# Patient Record
Sex: MALE | Race: WHITE | Employment: OTHER | URBAN - METROPOLITAN AREA
[De-identification: names, ages, dates, MRNs, and addresses within clinical notes are randomized per-mention and may not be internally consistent; named-entity substitution may affect disease eponyms.]

---

## 2021-02-13 ENCOUNTER — APPOINTMENT (OUTPATIENT)
Dept: ULTRASOUND IMAGING | Age: 71
DRG: 603 | End: 2021-02-13
Payer: MEDICARE

## 2021-02-13 ENCOUNTER — HOSPITAL ENCOUNTER (INPATIENT)
Age: 71
LOS: 1 days | Discharge: HOME | DRG: 603 | End: 2021-02-14
Attending: EMERGENCY MEDICINE | Admitting: HOSPITALIST
Payer: MEDICARE

## 2021-02-13 ENCOUNTER — APPOINTMENT (OUTPATIENT)
Dept: CT IMAGING | Age: 71
DRG: 603 | End: 2021-02-13
Payer: MEDICARE

## 2021-02-13 DIAGNOSIS — N39.0 URINARY TRACT INFECTION WITH HEMATURIA, SITE UNSPECIFIED: ICD-10-CM

## 2021-02-13 DIAGNOSIS — R31.9 URINARY TRACT INFECTION WITH HEMATURIA, SITE UNSPECIFIED: ICD-10-CM

## 2021-02-13 DIAGNOSIS — N43.3 BILATERAL HYDROCELE: ICD-10-CM

## 2021-02-13 DIAGNOSIS — N20.0 LEFT NEPHROLITHIASIS: ICD-10-CM

## 2021-02-13 DIAGNOSIS — L03.314 CELLULITIS OF GROIN: Primary | ICD-10-CM

## 2021-02-13 DIAGNOSIS — R33.9 URINARY RETENTION: ICD-10-CM

## 2021-02-13 PROBLEM — L03.319 CELLULITIS OF PUBIC REGION: Status: ACTIVE | Noted: 2021-02-13

## 2021-02-13 PROBLEM — R73.9 HYPERGLYCEMIA: Status: ACTIVE | Noted: 2021-02-13

## 2021-02-13 PROBLEM — R33.8 ACUTE URINARY RETENTION: Status: ACTIVE | Noted: 2021-02-13

## 2021-02-13 LAB
A/G RATIO: 1.2 (ref 1.1–2.2)
ALBUMIN SERPL-MCNC: 3.6 G/DL (ref 3.4–5)
ALP BLD-CCNC: 137 U/L (ref 40–129)
ALT SERPL-CCNC: 25 U/L (ref 10–40)
ANION GAP SERPL CALCULATED.3IONS-SCNC: 12 MMOL/L (ref 3–16)
AST SERPL-CCNC: 23 U/L (ref 15–37)
BACTERIA: ABNORMAL /HPF
BASOPHILS ABSOLUTE: 0 K/UL (ref 0–0.2)
BASOPHILS RELATIVE PERCENT: 0.1 %
BILIRUB SERPL-MCNC: 1.1 MG/DL (ref 0–1)
BILIRUBIN URINE: NEGATIVE
BLOOD, URINE: ABNORMAL
BUN BLDV-MCNC: 13 MG/DL (ref 7–20)
CALCIUM SERPL-MCNC: 8.7 MG/DL (ref 8.3–10.6)
CHLORIDE BLD-SCNC: 92 MMOL/L (ref 99–110)
CLARITY: CLEAR
CO2: 30 MMOL/L (ref 21–32)
COLOR: YELLOW
CREAT SERPL-MCNC: 1 MG/DL (ref 0.8–1.3)
EOSINOPHILS ABSOLUTE: 0 K/UL (ref 0–0.6)
EOSINOPHILS RELATIVE PERCENT: 0.1 %
GFR AFRICAN AMERICAN: >60
GFR NON-AFRICAN AMERICAN: >60
GLOBULIN: 3.1 G/DL
GLUCOSE BLD-MCNC: 141 MG/DL (ref 70–99)
GLUCOSE BLD-MCNC: 90 MG/DL (ref 70–99)
GLUCOSE URINE: NEGATIVE MG/DL
HCT VFR BLD CALC: 37.4 % (ref 40.5–52.5)
HEMOGLOBIN: 12.6 G/DL (ref 13.5–17.5)
KETONES, URINE: ABNORMAL MG/DL
LACTIC ACID: 0.9 MMOL/L (ref 0.4–2)
LEUKOCYTE ESTERASE, URINE: ABNORMAL
LYMPHOCYTES ABSOLUTE: 0.6 K/UL (ref 1–5.1)
LYMPHOCYTES RELATIVE PERCENT: 4 %
MAGNESIUM: 2.2 MG/DL (ref 1.8–2.4)
MCH RBC QN AUTO: 30.5 PG (ref 26–34)
MCHC RBC AUTO-ENTMCNC: 33.6 G/DL (ref 31–36)
MCV RBC AUTO: 90.8 FL (ref 80–100)
MICROSCOPIC EXAMINATION: YES
MONOCYTES ABSOLUTE: 0.6 K/UL (ref 0–1.3)
MONOCYTES RELATIVE PERCENT: 4.1 %
NEUTROPHILS ABSOLUTE: 13.4 K/UL (ref 1.7–7.7)
NEUTROPHILS RELATIVE PERCENT: 91.7 %
NITRITE, URINE: POSITIVE
PDW BLD-RTO: 13.8 % (ref 12.4–15.4)
PERFORMED ON: NORMAL
PH UA: 6 (ref 5–8)
PLATELET # BLD: 201 K/UL (ref 135–450)
PMV BLD AUTO: 7.2 FL (ref 5–10.5)
POTASSIUM REFLEX MAGNESIUM: 3.4 MMOL/L (ref 3.5–5.1)
PROTEIN UA: ABNORMAL MG/DL
RBC # BLD: 4.12 M/UL (ref 4.2–5.9)
RBC UA: ABNORMAL /HPF (ref 0–4)
SODIUM BLD-SCNC: 134 MMOL/L (ref 136–145)
SPECIFIC GRAVITY UA: 1.02 (ref 1–1.03)
TOTAL PROTEIN: 6.7 G/DL (ref 6.4–8.2)
URINE TYPE: ABNORMAL
UROBILINOGEN, URINE: 0.2 E.U./DL
WBC # BLD: 14.7 K/UL (ref 4–11)
WBC UA: >100 /HPF (ref 0–5)

## 2021-02-13 PROCEDURE — 96361 HYDRATE IV INFUSION ADD-ON: CPT

## 2021-02-13 PROCEDURE — 87077 CULTURE AEROBIC IDENTIFY: CPT

## 2021-02-13 PROCEDURE — 81001 URINALYSIS AUTO W/SCOPE: CPT

## 2021-02-13 PROCEDURE — 74177 CT ABD & PELVIS W/CONTRAST: CPT

## 2021-02-13 PROCEDURE — 85025 COMPLETE CBC W/AUTO DIFF WBC: CPT

## 2021-02-13 PROCEDURE — 6360000002 HC RX W HCPCS: Performed by: PHYSICIAN ASSISTANT

## 2021-02-13 PROCEDURE — 6360000004 HC RX CONTRAST MEDICATION: Performed by: PHYSICIAN ASSISTANT

## 2021-02-13 PROCEDURE — G0378 HOSPITAL OBSERVATION PER HR: HCPCS

## 2021-02-13 PROCEDURE — 96375 TX/PRO/DX INJ NEW DRUG ADDON: CPT

## 2021-02-13 PROCEDURE — 87086 URINE CULTURE/COLONY COUNT: CPT

## 2021-02-13 PROCEDURE — 6370000000 HC RX 637 (ALT 250 FOR IP): Performed by: HOSPITALIST

## 2021-02-13 PROCEDURE — 87040 BLOOD CULTURE FOR BACTERIA: CPT

## 2021-02-13 PROCEDURE — 6360000002 HC RX W HCPCS: Performed by: HOSPITALIST

## 2021-02-13 PROCEDURE — 96367 TX/PROPH/DG ADDL SEQ IV INF: CPT

## 2021-02-13 PROCEDURE — 83735 ASSAY OF MAGNESIUM: CPT

## 2021-02-13 PROCEDURE — 1200000000 HC SEMI PRIVATE

## 2021-02-13 PROCEDURE — 76870 US EXAM SCROTUM: CPT

## 2021-02-13 PROCEDURE — 87186 SC STD MICRODIL/AGAR DIL: CPT

## 2021-02-13 PROCEDURE — 80053 COMPREHEN METABOLIC PANEL: CPT

## 2021-02-13 PROCEDURE — 2580000003 HC RX 258: Performed by: HOSPITALIST

## 2021-02-13 PROCEDURE — 96365 THER/PROPH/DIAG IV INF INIT: CPT

## 2021-02-13 PROCEDURE — 6370000000 HC RX 637 (ALT 250 FOR IP): Performed by: PHYSICIAN ASSISTANT

## 2021-02-13 PROCEDURE — 2580000003 HC RX 258: Performed by: PHYSICIAN ASSISTANT

## 2021-02-13 PROCEDURE — 51702 INSERT TEMP BLADDER CATH: CPT

## 2021-02-13 PROCEDURE — 99285 EMERGENCY DEPT VISIT HI MDM: CPT

## 2021-02-13 PROCEDURE — 83605 ASSAY OF LACTIC ACID: CPT

## 2021-02-13 PROCEDURE — 96366 THER/PROPH/DIAG IV INF ADDON: CPT

## 2021-02-13 RX ORDER — LIDOCAINE HYDROCHLORIDE 20 MG/ML
JELLY TOPICAL ONCE
Status: COMPLETED | OUTPATIENT
Start: 2021-02-13 | End: 2021-02-13

## 2021-02-13 RX ORDER — ACETAMINOPHEN 650 MG/1
650 SUPPOSITORY RECTAL EVERY 6 HOURS PRN
Status: DISCONTINUED | OUTPATIENT
Start: 2021-02-13 | End: 2021-02-14 | Stop reason: HOSPADM

## 2021-02-13 RX ORDER — MAGNESIUM SULFATE IN WATER 40 MG/ML
2000 INJECTION, SOLUTION INTRAVENOUS PRN
Status: DISCONTINUED | OUTPATIENT
Start: 2021-02-13 | End: 2021-02-14

## 2021-02-13 RX ORDER — 0.9 % SODIUM CHLORIDE 0.9 %
1000 INTRAVENOUS SOLUTION INTRAVENOUS ONCE
Status: COMPLETED | OUTPATIENT
Start: 2021-02-13 | End: 2021-02-13

## 2021-02-13 RX ORDER — ONDANSETRON 2 MG/ML
4 INJECTION INTRAMUSCULAR; INTRAVENOUS ONCE
Status: COMPLETED | OUTPATIENT
Start: 2021-02-13 | End: 2021-02-13

## 2021-02-13 RX ORDER — MORPHINE SULFATE 4 MG/ML
4 INJECTION, SOLUTION INTRAMUSCULAR; INTRAVENOUS ONCE
Status: COMPLETED | OUTPATIENT
Start: 2021-02-13 | End: 2021-02-13

## 2021-02-13 RX ORDER — POTASSIUM CHLORIDE 7.45 MG/ML
10 INJECTION INTRAVENOUS PRN
Status: DISCONTINUED | OUTPATIENT
Start: 2021-02-13 | End: 2021-02-14

## 2021-02-13 RX ORDER — NICOTINE POLACRILEX 4 MG
15 LOZENGE BUCCAL PRN
Status: DISCONTINUED | OUTPATIENT
Start: 2021-02-13 | End: 2021-02-14 | Stop reason: HOSPADM

## 2021-02-13 RX ORDER — LIDOCAINE HYDROCHLORIDE 20 MG/ML
JELLY TOPICAL
Status: DISCONTINUED
Start: 2021-02-13 | End: 2021-02-13

## 2021-02-13 RX ORDER — POTASSIUM CHLORIDE 20 MEQ/1
40 TABLET, EXTENDED RELEASE ORAL PRN
Status: DISCONTINUED | OUTPATIENT
Start: 2021-02-13 | End: 2021-02-14

## 2021-02-13 RX ORDER — ACETAMINOPHEN 325 MG/1
650 TABLET ORAL ONCE
Status: COMPLETED | OUTPATIENT
Start: 2021-02-13 | End: 2021-02-13

## 2021-02-13 RX ORDER — PROMETHAZINE HYDROCHLORIDE 25 MG/1
12.5 TABLET ORAL EVERY 6 HOURS PRN
Status: DISCONTINUED | OUTPATIENT
Start: 2021-02-13 | End: 2021-02-14 | Stop reason: HOSPADM

## 2021-02-13 RX ORDER — DEXTROSE MONOHYDRATE 50 MG/ML
100 INJECTION, SOLUTION INTRAVENOUS PRN
Status: DISCONTINUED | OUTPATIENT
Start: 2021-02-13 | End: 2021-02-14 | Stop reason: HOSPADM

## 2021-02-13 RX ORDER — SODIUM CHLORIDE 0.9 % (FLUSH) 0.9 %
10 SYRINGE (ML) INJECTION EVERY 12 HOURS SCHEDULED
Status: DISCONTINUED | OUTPATIENT
Start: 2021-02-13 | End: 2021-02-14 | Stop reason: HOSPADM

## 2021-02-13 RX ORDER — SODIUM CHLORIDE 9 MG/ML
INJECTION, SOLUTION INTRAVENOUS CONTINUOUS
Status: ACTIVE | OUTPATIENT
Start: 2021-02-13 | End: 2021-02-14

## 2021-02-13 RX ORDER — DEXTROSE MONOHYDRATE 25 G/50ML
12.5 INJECTION, SOLUTION INTRAVENOUS PRN
Status: DISCONTINUED | OUTPATIENT
Start: 2021-02-13 | End: 2021-02-14 | Stop reason: HOSPADM

## 2021-02-13 RX ORDER — ACETAMINOPHEN 325 MG/1
650 TABLET ORAL EVERY 6 HOURS PRN
Status: DISCONTINUED | OUTPATIENT
Start: 2021-02-13 | End: 2021-02-14 | Stop reason: HOSPADM

## 2021-02-13 RX ORDER — SODIUM CHLORIDE 0.9 % (FLUSH) 0.9 %
10 SYRINGE (ML) INJECTION PRN
Status: DISCONTINUED | OUTPATIENT
Start: 2021-02-13 | End: 2021-02-14 | Stop reason: HOSPADM

## 2021-02-13 RX ORDER — ONDANSETRON 2 MG/ML
4 INJECTION INTRAMUSCULAR; INTRAVENOUS EVERY 6 HOURS PRN
Status: DISCONTINUED | OUTPATIENT
Start: 2021-02-13 | End: 2021-02-14 | Stop reason: HOSPADM

## 2021-02-13 RX ORDER — FAMOTIDINE 20 MG/1
20 TABLET, FILM COATED ORAL 2 TIMES DAILY
Status: DISCONTINUED | OUTPATIENT
Start: 2021-02-13 | End: 2021-02-14 | Stop reason: HOSPADM

## 2021-02-13 RX ORDER — SENNA PLUS 8.6 MG/1
1 TABLET ORAL DAILY PRN
Status: DISCONTINUED | OUTPATIENT
Start: 2021-02-13 | End: 2021-02-14 | Stop reason: HOSPADM

## 2021-02-13 RX ORDER — OXYCODONE HYDROCHLORIDE 5 MG/1
5 TABLET ORAL EVERY 6 HOURS PRN
Status: DISCONTINUED | OUTPATIENT
Start: 2021-02-13 | End: 2021-02-14 | Stop reason: HOSPADM

## 2021-02-13 RX ADMIN — ACETAMINOPHEN 650 MG: 325 TABLET ORAL at 15:27

## 2021-02-13 RX ADMIN — LIDOCAINE HYDROCHLORIDE: 20 JELLY TOPICAL at 15:50

## 2021-02-13 RX ADMIN — IOPAMIDOL 75 ML: 755 INJECTION, SOLUTION INTRAVENOUS at 13:59

## 2021-02-13 RX ADMIN — FAMOTIDINE 20 MG: 20 TABLET, FILM COATED ORAL at 21:03

## 2021-02-13 RX ADMIN — MORPHINE SULFATE 4 MG: 4 INJECTION, SOLUTION INTRAMUSCULAR; INTRAVENOUS at 13:00

## 2021-02-13 RX ADMIN — SODIUM CHLORIDE 1000 ML: 9 INJECTION, SOLUTION INTRAVENOUS at 15:28

## 2021-02-13 RX ADMIN — SODIUM CHLORIDE: 9 INJECTION, SOLUTION INTRAVENOUS at 19:31

## 2021-02-13 RX ADMIN — ONDANSETRON 4 MG: 2 INJECTION INTRAMUSCULAR; INTRAVENOUS at 13:00

## 2021-02-13 RX ADMIN — CEFEPIME HYDROCHLORIDE 2000 MG: 2 INJECTION, POWDER, FOR SOLUTION INTRAVENOUS at 21:03

## 2021-02-13 RX ADMIN — SODIUM CHLORIDE 1000 ML: 9 INJECTION, SOLUTION INTRAVENOUS at 13:00

## 2021-02-13 RX ADMIN — VANCOMYCIN HYDROCHLORIDE 1000 MG: 1 INJECTION, POWDER, LYOPHILIZED, FOR SOLUTION INTRAVENOUS at 16:47

## 2021-02-13 RX ADMIN — CEFTRIAXONE SODIUM 1000 MG: 1 INJECTION, POWDER, FOR SOLUTION INTRAMUSCULAR; INTRAVENOUS at 15:27

## 2021-02-13 RX ADMIN — OXYCODONE 5 MG: 5 TABLET ORAL at 21:03

## 2021-02-13 ASSESSMENT — ENCOUNTER SYMPTOMS
NAUSEA: 0
EYES NEGATIVE: 1
COUGH: 0
COLOR CHANGE: 0
CONSTIPATION: 0
DIARRHEA: 0
VOMITING: 0
BACK PAIN: 0
SHORTNESS OF BREATH: 0
ABDOMINAL PAIN: 0

## 2021-02-13 ASSESSMENT — PAIN DESCRIPTION - PAIN TYPE
TYPE: ACUTE PAIN
TYPE: ACUTE PAIN

## 2021-02-13 ASSESSMENT — PAIN SCALES - GENERAL
PAINLEVEL_OUTOF10: 0
PAINLEVEL_OUTOF10: 5
PAINLEVEL_OUTOF10: 3
PAINLEVEL_OUTOF10: 5

## 2021-02-13 ASSESSMENT — PAIN DESCRIPTION - LOCATION: LOCATION: SCROTUM

## 2021-02-13 NOTE — PROGRESS NOTES
Patient admitted to room 207 from ER. Patient oriented to room, call light, bed rails, phone, lights and bathroom. Patient instructed about the schedule of the day including: vital sign frequency, lab draws, possible tests, frequency of MD and staff rounds, including RN/MD rounding together at bedside, daily weights, and I &O's. Patient instructed about prescribed diet, how to use 8MENU, and television. Bed alarm not in place, patient is independent with no fall history. Telemetry box in place, patient aware of placement and reason. Bed locked, in lowest position, side rails up 2/4, call light within reach. Will continue to monitor.   Electronically signed by Neel Carrington RN on 2/13/2021 at 6:57 PM

## 2021-02-13 NOTE — ED PROVIDER NOTES
Eyes: Negative. Respiratory: Negative for cough and shortness of breath. Cardiovascular: Negative for chest pain. Gastrointestinal: Negative for abdominal pain, constipation, diarrhea, nausea and vomiting. Genitourinary: Positive for difficulty urinating, dysuria, hematuria and testicular pain. Negative for flank pain. Musculoskeletal: Negative for back pain and neck pain. Skin: Negative for color change. Neurological: Negative for dizziness and headaches. All other systems reviewed and are negative. Except as noted above in the ROS, all other systems were reviewed and negative. PAST MEDICAL HISTORY:   No past medical history on file. SURGICAL HISTORY:    No past surgical history on file. CURRENT MEDICATIONS:       Previous Medications    No medications on file         ALLERGIES:    Patient has no known allergies. FAMILY HISTORY:     No family history on file.        SOCIAL HISTORY:       Social History     Socioeconomic History    Marital status:      Spouse name: Not on file    Number of children: Not on file    Years of education: Not on file    Highest education level: Not on file   Occupational History    Not on file   Social Needs    Financial resource strain: Not on file    Food insecurity     Worry: Not on file     Inability: Not on file    Transportation needs     Medical: Not on file     Non-medical: Not on file   Tobacco Use    Smoking status: Not on file   Substance and Sexual Activity    Alcohol use: Not on file    Drug use: Not on file    Sexual activity: Not on file   Lifestyle    Physical activity     Days per week: Not on file     Minutes per session: Not on file    Stress: Not on file   Relationships    Social connections     Talks on phone: Not on file     Gets together: Not on file     Attends Latter day service: Not on file     Active member of club or organization: Not on file Attends meetings of clubs or organizations: Not on file     Relationship status: Not on file    Intimate partner violence     Fear of current or ex partner: Not on file     Emotionally abused: Not on file     Physically abused: Not on file     Forced sexual activity: Not on file   Other Topics Concern    Not on file   Social History Narrative    Not on file       SCREENINGS:             PHYSICAL EXAM:       ED Triage Vitals [02/13/21 1239]   BP Temp Temp Source Pulse Resp SpO2 Height Weight   113/78 99.8 °F (37.7 °C) Oral 111 20 100 % 5' 8\" (1.727 m) 185 lb (83.9 kg)       Physical Exam    CONSTITUTIONAL: Awake and alert. Cooperative. Well-developed. Well-nourished. Non-toxic. No acute distress. HENT: Normocephalic. Atraumatic. External ears normal, without discharge. No nasal discharge. Oropharynx clear. Mucous membranes moist.  EYES: Conjunctiva non-injected. No scleral icterus. PERRL. EOM's grossly intact. NECK: Supple. Normal ROM. CARDIOVASCULAR: RRR. No Murmer. Intact distal pulses. PULMONARY/CHEST WALL: Effort normal. No tachypnea. Lungs clear to ausculation. ABDOMEN: Normal BS. Soft. Nondistended. No tenderness to palpate. No guarding. /ANORECTAL: Circumcised male. No penile discharge or pain to the penis. Patient has generalized scrotal tenderness and mild bilateral testicular pain to palpate. No palpable mass. Mild erythema most notable to the suprapubic area, just above the shaft of the penis and slightly to the groin. No crepitus to palpate the soft tissue in the genitalia, groin/suprapubic region. MUSKULOSKELETAL: Normal ROM. No acute deformities. No edema. No tenderness to palpate. SKIN: Warm and dry. No rash. NEUROLOGICAL: Alert and oriented x 3. GCS 15. CN II-XII grossly intact. Strength is 5/5 in all extremities and sensation is intact. Normal gait.    PSYCHIATRIC: Normal affect        DIAGNOSTICRESULTS:     LABS: Alkaline Phosphatase 137 (H) 40 - 129 U/L    ALT 25 10 - 40 U/L    AST 23 15 - 37 U/L    Globulin 3.1 g/dL   Magnesium   Result Value Ref Range    Magnesium 2.20 1.80 - 2.40 mg/dL   Lactic acid, plasma   Result Value Ref Range    Lactic Acid 0.9 0.4 - 2.0 mmol/L   Microscopic Urinalysis   Result Value Ref Range    WBC, UA >100 (A) 0 - 5 /HPF    RBC, UA see below (A) 0 - 4 /HPF    Bacteria, UA 1+ (A) None Seen /HPF         RADIOLOGY:  All x-ray studies areviewed/reviewed by me. Formal interpretations per the radiologist are as follows:      Us Scrotum And Testicles    Result Date: 2/13/2021  EXAMINATION: ULTRASOUND OF THE SCROTUM/TESTICLES WITH COLOR DOPPLER FLOW EVALUATION 2/13/2021 COMPARISON: CT abdomen and pelvis, today HISTORY: ORDERING SYSTEM PROVIDED HISTORY: bilateral testicular/scrotal pain TECHNOLOGIST PROVIDED HISTORY: Reason for exam:->bilateral testicular/scrotal pain FINDINGS: Measurements: Right testicle: 2.7 x 2.9 x 3.1 cm Left testicle: 3.5 x 2.6 x 2.7 cm Right: Grey scale: The right testicle demonstrates normal homogeneous echotexture without focal lesion. No evidence of testicular microlithiasis. Doppler Evaluation:  There is normal arterial and venous Doppler flow within the testicle. Scrotal Sac:  Small hydrocele is noted. Epididymis:  No acute abnormality. Several cysts are present, the largest 1.3 cm epididymal cyst versus spermatic granuloma. Left: Grey scale: The left testicle demonstrates normal homogeneous echotexture without focal lesion. No evidence of testicular microlithiasis. Doppler Evaluation:  There is normal arterial and venous Doppler flow within the testicle. Scrotal Sac:  Small hydroceles noted. Epididymis:  No acute abnormality. 0.4 cm epididymal cyst versus spermatic granuloma. No intratesticular mass. Normal Doppler vascularity. Small bilateral hydroceles. Epididymal cysts are noted. These have a benign appearance, cysts or spermatic granuloma. Ct Abdomen Pelvis W Iv Contrast Additional Contrast? None    Result Date: 2/13/2021  EXAMINATION: CT OF THE ABDOMEN AND PELVIS WITH CONTRAST 2/13/2021 1:50 pm TECHNIQUE: CT of the abdomen and pelvis was performed with the administration of intravenous contrast. Multiplanar reformatted images are provided for review. Dose modulation, iterative reconstruction, and/or weight based adjustment of the mA/kV was utilized to reduce the radiation dose to as low as reasonably achievable. COMPARISON: None. HISTORY: ORDERING SYSTEM PROVIDED HISTORY: scrotal pain, hematuria TECHNOLOGIST PROVIDED HISTORY: Reason for exam:->scrotal pain, hematuria Additional Contrast?->None Decision Support Exception->Emergency Medical Condition (MA) Reason for Exam: hematuria, groin pain x4-5 days, difficulty urinating; no hx of kidney stones Acuity: Acute Type of Exam: Initial Relevant Medical/Surgical History: hx of hydrocele FINDINGS: Lower Chest: There is no consolidation or effusion. Organs: There are multiple tiny cysts scattered throughout the liver and kidneys. These range in size up to 3 cm in diameter. There is a nonobstructing 1 mm calcification within the left intrarenal collecting system. The remainder of the solid abdominal organs are unremarkable. GI/Bowel: There is no bowel dilatation, wall thickening or obstruction. The appendix is normal. Pelvis: There is circumferential wall thickening of the bladder. A small 15 mm diverticulum arises from the rightward bladder wall. Peritoneum/Retroperitoneum: There is no free air, free fluid or intraperitoneal inflammatory change. There is no adenopathy. Bones/Soft Tissues: There is no acute fracture or aggressive osseous lesion. Note is made of a small 2.5 cm fat containing umbilical hernia. Nonobstructing left nephrolithiasis. Nonspecific bladder wall thickening. The differential includes chronic outlet obstruction and cystitis.        PROCEDURES:   N/A    CRITICAL CARE TIME: Due to the immediate potential for life-threatening deterioration due to concern for sepsis with possible cellulitis and differentials of necrotizing fasciitis or [place] gangrene, I spent 32 minutes providing critical care. This time is excluding time spent performing procedures. CONSULTS:  IP CONSULT TO UROLOGY  IP CONSULT TO HOSPITALIST      EMERGENCY DEPARTMENT COURSE and DIFFERENTIAL DIAGNOSIS/MDM:   Vitals:    Vitals:    02/13/21 1521 02/13/21 1523 02/13/21 1525 02/13/21 1555   BP: 104/70  104/70 101/66   Pulse: 95  93 88   Resp:   18 18   Temp:  99.5 °F (37.5 °C)     TempSrc:  Oral     SpO2: 93%  90% 94%   Weight:       Height:           Patient was given the following medications:  Medications   lidocaine (XYLOCAINE) 2 % jelly (has no administration in time range)   lidocaine (XYLOCAINE) 2 % uro-jet (has no administration in time range)   0.9 % sodium chloride bolus (1,000 mLs Intravenous New Bag 2/13/21 1528)   vancomycin 1000 mg IVPB in 250 mL D5W addavial (has no administration in time range)   0.9 % sodium chloride bolus (0 mLs Intravenous Stopped 2/13/21 1524)   ondansetron (ZOFRAN) injection 4 mg (4 mg Intravenous Given 2/13/21 1300)   morphine (PF) injection 4 mg (4 mg Intravenous Given 2/13/21 1300)   iopamidol (ISOVUE-370) 76 % injection 75 mL (75 mLs Intravenous Given 2/13/21 1359)   cefTRIAXone (ROCEPHIN) 1000 mg IVPB in 50 mL D5W minibag (1,000 mg Intravenous New Bag 2/13/21 1527)   acetaminophen (TYLENOL) tablet 650 mg (650 mg Oral Given 2/13/21 1527)         Patient was evaluated by both myself and Merlene Peck MD.   Old records were reviewed. Patient arrives to the ED describing essentially 1 week of not feeling well. He arrives complaining of pain to his genitalia and has had some dysuria and difficulty with urination. He arrives with a low-grade temp of 99.8 °F.  He is tachycardic at 111 bpm and has had a pressure in the low 100s throughout his ED stay. Patient received a total of 2 L normal saline IV, morphine 4 mg IV with Zofran 4 mg IV for symptoms/pain here. CBC reveals elevated white count of 14.7 with H&H 12.6 and 37.4  CMP reveals sodium 134, chloride 92, potassium 3.4. Magnesium 2.2  Lactate 0.9  Urinalysis with moderate blood, trace ketone, trace protein, small leukocytes, positive nitrates, >100 WBCs, red blood cells present and 1+ bacteria  Ultrasound of the scrotum and testicles shows no intratesticular mass. Normal Doppler vascularity. Small bilateral hydroceles. Epididymal cysts noted. CT abdomen pelvis with IV contrast shows nonobstructing left nephrolithiasis. Nonspecific bladder wall thickening. The differential includes chronic outlet obstruction and cystitis. Patient continued to have a soft pressure and low-grade fever here. He has an elevated white count and urine is convincingly infected. Blood cultures x2 were drawn. Urine culture sent. Patient given Tylenol 650 mg orally as well as Rocephin 1 g IV and vancomycin 1 g IV. He is stable. I am consulting urology and ultimately the hospitalist as well to admit this patient for his UTI with urinary retention as well as with appears to be some cellulitis of the groin/suprapubic region. I spoke with Dr. Ileana Thornton and Dr. Mine Christianson (urology). We thoroughly discussed the history, physical exam, laboratory and imaging studies, as well as, emergency department course. Based upon that discussion, we've decided to admit Tommie Horner for further observation and evaluation of [facility] UTI with urinary retention groin cellulitis. As I have deemed necessary from their history, physical and studies, I have considered and evaluated Tommie Horner for the following diagnoses:  FRANCES's GANGRENE, ACUTE APPENDICITIS, CHOLECYSTITIS, DIVERTICULITIS, PANCREATITIS, PYELONEPHRITIS, BOWEL OBSTRUCTION, INCARCERATED HERNIA, ISCHEMIC GUT, GI BLEED, PERFORATED BOWEL or ULCER. FINAL IMPRESSION:      1. Cellulitis of groin    2. Urinary tract infection with hematuria, site unspecified    3. Urinary retention    4. Left nephrolithiasis    5.  Bilateral hydrocele          DISPOSITION/PLAN:   DISPOSITION Decision To Admit               (Please note thatportions of this note were completed with a voice recognition program.  Efforts were made to edit the dictations, but occasionally words are mis-transcribed.)    Nolan Kurtz PA-C (electronicallysigned)              ROSANNA Beaulieu  02/13/21 6977

## 2021-02-13 NOTE — CONSULTS
Pharmacy Note  Vancomycin Consult  Dx: Cellulitis pubis/scrotum  Allergies:  Patient has no known allergies. Recent Labs     02/13/21  1256   CREATININE 1.0       Recent Labs     02/13/21  1256   WBC 14.7*       Estimated Creatinine Clearance: 71 mL/min (based on SCr of 1 mg/dL).     Wt Readings from Last 1 Encounters:   02/13/21 185 lb (83.9 kg)       Goal Vancomycin trough: 10-15 mcg/mL   Goal Vancomycin AUC: 400-600mg/L/hr    Assessment/Plan:  Vancomycin 1000mg IVPB x 1 given at 1657  Initiate vancomycin 750mg Q12H  Predicted trough - 15.1mg/L  Predicted AUC - 445mg/L/hr    Trough prior to 2/15 0400 dose    Cayla Restrepo PharmD 2/13/2021 6:45 PM

## 2021-02-13 NOTE — ED NOTES
RN rounded on pt. Pain re-assessed and patient updated on plan of care. Patient given mouth swabs per patient request. Patient remains on monitor. Patient has no further needs at this time. Pt resting in bed, call light within reach. Pt denies any questions.         Sabrina Romero, EMELI  02/13/21 8535

## 2021-02-14 VITALS
OXYGEN SATURATION: 93 % | SYSTOLIC BLOOD PRESSURE: 129 MMHG | BODY MASS INDEX: 30.33 KG/M2 | WEIGHT: 200.1 LBS | HEIGHT: 68 IN | RESPIRATION RATE: 14 BRPM | DIASTOLIC BLOOD PRESSURE: 73 MMHG | HEART RATE: 85 BPM | TEMPERATURE: 98.6 F

## 2021-02-14 LAB
A/G RATIO: 1.3 (ref 1.1–2.2)
ALBUMIN SERPL-MCNC: 3.3 G/DL (ref 3.4–5)
ALP BLD-CCNC: 106 U/L (ref 40–129)
ALT SERPL-CCNC: 23 U/L (ref 10–40)
ANION GAP SERPL CALCULATED.3IONS-SCNC: 8 MMOL/L (ref 3–16)
AST SERPL-CCNC: 23 U/L (ref 15–37)
BASOPHILS ABSOLUTE: 0 K/UL (ref 0–0.2)
BASOPHILS RELATIVE PERCENT: 0.2 %
BILIRUB SERPL-MCNC: 0.7 MG/DL (ref 0–1)
BUN BLDV-MCNC: 11 MG/DL (ref 7–20)
CALCIUM SERPL-MCNC: 8.2 MG/DL (ref 8.3–10.6)
CHLORIDE BLD-SCNC: 101 MMOL/L (ref 99–110)
CHOLESTEROL, TOTAL: 181 MG/DL (ref 0–199)
CO2: 30 MMOL/L (ref 21–32)
CREAT SERPL-MCNC: 0.9 MG/DL (ref 0.8–1.3)
EKG ATRIAL RATE: 84 BPM
EKG DIAGNOSIS: NORMAL
EKG P AXIS: 55 DEGREES
EKG P-R INTERVAL: 138 MS
EKG Q-T INTERVAL: 374 MS
EKG QRS DURATION: 102 MS
EKG QTC CALCULATION (BAZETT): 441 MS
EKG R AXIS: -3 DEGREES
EKG T AXIS: 18 DEGREES
EKG VENTRICULAR RATE: 84 BPM
EOSINOPHILS ABSOLUTE: 0.1 K/UL (ref 0–0.6)
EOSINOPHILS RELATIVE PERCENT: 1.2 %
GFR AFRICAN AMERICAN: >60
GFR NON-AFRICAN AMERICAN: >60
GLOBULIN: 2.5 G/DL
GLUCOSE BLD-MCNC: 113 MG/DL (ref 70–99)
GLUCOSE BLD-MCNC: 117 MG/DL (ref 70–99)
GLUCOSE BLD-MCNC: 87 MG/DL (ref 70–99)
HCT VFR BLD CALC: 32.8 % (ref 40.5–52.5)
HDLC SERPL-MCNC: 31 MG/DL (ref 40–60)
HEMOGLOBIN: 11.2 G/DL (ref 13.5–17.5)
LDL CHOLESTEROL CALCULATED: 126 MG/DL
LYMPHOCYTES ABSOLUTE: 0.7 K/UL (ref 1–5.1)
LYMPHOCYTES RELATIVE PERCENT: 7.4 %
MAGNESIUM: 2.2 MG/DL (ref 1.8–2.4)
MCH RBC QN AUTO: 31.1 PG (ref 26–34)
MCHC RBC AUTO-ENTMCNC: 34.3 G/DL (ref 31–36)
MCV RBC AUTO: 90.7 FL (ref 80–100)
MONOCYTES ABSOLUTE: 0.6 K/UL (ref 0–1.3)
MONOCYTES RELATIVE PERCENT: 6.2 %
NEUTROPHILS ABSOLUTE: 8.5 K/UL (ref 1.7–7.7)
NEUTROPHILS RELATIVE PERCENT: 85 %
PDW BLD-RTO: 13.5 % (ref 12.4–15.4)
PERFORMED ON: ABNORMAL
PERFORMED ON: NORMAL
PLATELET # BLD: 195 K/UL (ref 135–450)
PMV BLD AUTO: 6.9 FL (ref 5–10.5)
POTASSIUM REFLEX MAGNESIUM: 3.2 MMOL/L (ref 3.5–5.1)
PROSTATE SPECIFIC ANTIGEN: 1.13 NG/ML (ref 0–4)
RBC # BLD: 3.62 M/UL (ref 4.2–5.9)
SODIUM BLD-SCNC: 139 MMOL/L (ref 136–145)
TOTAL PROTEIN: 5.8 G/DL (ref 6.4–8.2)
TRIGL SERPL-MCNC: 122 MG/DL (ref 0–150)
VLDLC SERPL CALC-MCNC: 24 MG/DL
WBC # BLD: 10 K/UL (ref 4–11)

## 2021-02-14 PROCEDURE — 6360000002 HC RX W HCPCS: Performed by: HOSPITALIST

## 2021-02-14 PROCEDURE — 2500000003 HC RX 250 WO HCPCS: Performed by: HOSPITALIST

## 2021-02-14 PROCEDURE — 83036 HEMOGLOBIN GLYCOSYLATED A1C: CPT

## 2021-02-14 PROCEDURE — 80053 COMPREHEN METABOLIC PANEL: CPT

## 2021-02-14 PROCEDURE — 84153 ASSAY OF PSA TOTAL: CPT

## 2021-02-14 PROCEDURE — 83735 ASSAY OF MAGNESIUM: CPT

## 2021-02-14 PROCEDURE — 6370000000 HC RX 637 (ALT 250 FOR IP): Performed by: HOSPITALIST

## 2021-02-14 PROCEDURE — G0378 HOSPITAL OBSERVATION PER HR: HCPCS

## 2021-02-14 PROCEDURE — 96367 TX/PROPH/DG ADDL SEQ IV INF: CPT

## 2021-02-14 PROCEDURE — 96366 THER/PROPH/DIAG IV INF ADDON: CPT

## 2021-02-14 PROCEDURE — 85025 COMPLETE CBC W/AUTO DIFF WBC: CPT

## 2021-02-14 PROCEDURE — 36415 COLL VENOUS BLD VENIPUNCTURE: CPT

## 2021-02-14 PROCEDURE — 2580000003 HC RX 258: Performed by: HOSPITALIST

## 2021-02-14 PROCEDURE — 93005 ELECTROCARDIOGRAM TRACING: CPT | Performed by: HOSPITALIST

## 2021-02-14 PROCEDURE — 96361 HYDRATE IV INFUSION ADD-ON: CPT

## 2021-02-14 PROCEDURE — 6370000000 HC RX 637 (ALT 250 FOR IP): Performed by: NURSE PRACTITIONER

## 2021-02-14 PROCEDURE — 93010 ELECTROCARDIOGRAM REPORT: CPT | Performed by: INTERNAL MEDICINE

## 2021-02-14 PROCEDURE — 80061 LIPID PANEL: CPT

## 2021-02-14 RX ORDER — POTASSIUM CHLORIDE 20 MEQ/1
40 TABLET, EXTENDED RELEASE ORAL 2 TIMES DAILY
Status: DISCONTINUED | OUTPATIENT
Start: 2021-02-14 | End: 2021-02-14

## 2021-02-14 RX ORDER — TAMSULOSIN HYDROCHLORIDE 0.4 MG/1
0.4 CAPSULE ORAL DAILY
Qty: 30 CAPSULE | Refills: 3 | Status: SHIPPED | OUTPATIENT
Start: 2021-02-15

## 2021-02-14 RX ORDER — POTASSIUM CHLORIDE 20 MEQ/1
40 TABLET, EXTENDED RELEASE ORAL ONCE
Status: DISCONTINUED | OUTPATIENT
Start: 2021-02-14 | End: 2021-02-14 | Stop reason: HOSPADM

## 2021-02-14 RX ORDER — TAMSULOSIN HYDROCHLORIDE 0.4 MG/1
0.4 CAPSULE ORAL DAILY
Status: DISCONTINUED | OUTPATIENT
Start: 2021-02-14 | End: 2021-02-14 | Stop reason: HOSPADM

## 2021-02-14 RX ORDER — SULFAMETHOXAZOLE AND TRIMETHOPRIM 800; 160 MG/1; MG/1
1 TABLET ORAL 2 TIMES DAILY
Qty: 14 TABLET | Refills: 0 | Status: SHIPPED | OUTPATIENT
Start: 2021-02-14 | End: 2021-02-21

## 2021-02-14 RX ADMIN — POTASSIUM CHLORIDE 40 MEQ: 20 TABLET, EXTENDED RELEASE ORAL at 08:57

## 2021-02-14 RX ADMIN — POTASSIUM CHLORIDE 40 MEQ: 1500 TABLET, EXTENDED RELEASE ORAL at 07:12

## 2021-02-14 RX ADMIN — METRONIDAZOLE 500 MG: 500 INJECTION, SOLUTION INTRAVENOUS at 07:12

## 2021-02-14 RX ADMIN — FAMOTIDINE 20 MG: 20 TABLET, FILM COATED ORAL at 08:57

## 2021-02-14 RX ADMIN — TAMSULOSIN HYDROCHLORIDE 0.4 MG: 0.4 CAPSULE ORAL at 10:22

## 2021-02-14 RX ADMIN — OXYCODONE 5 MG: 5 TABLET ORAL at 10:21

## 2021-02-14 RX ADMIN — CEFEPIME HYDROCHLORIDE 2000 MG: 2 INJECTION, POWDER, FOR SOLUTION INTRAVENOUS at 08:58

## 2021-02-14 RX ADMIN — VANCOMYCIN HYDROCHLORIDE 750 MG: 750 INJECTION, POWDER, LYOPHILIZED, FOR SOLUTION INTRAVENOUS at 05:13

## 2021-02-14 ASSESSMENT — PAIN SCALES - GENERAL: PAINLEVEL_OUTOF10: 7

## 2021-02-14 NOTE — H&P
HOSPITALISTS HISTORY AND PHYSICAL    2/13/2021 9:59 PM    Patient Information:  Blanca Miles is a 69 y.o. male [id number]  PCP:  No primary care provider on file. (Tel: None )    Chief complaint:    Chief Complaint   Patient presents with    Scrotal Pain     thinks he has a kidney stone in his scrotum    Hematuria     reports intermittent hematuria for the last week. Especially at \"the end of urination\". Also reports urinary frequency with feelings of not emptying his bladder completely. pt denies flank or abd pain. denies h/o kidney stones. History of Present Illness:  Doron Arzola is a 69 y.o. male who presented to the ED to be evaluated for 1 week history of intermittent hematuria, urinary frequency, dysuria, and scrotal pain. His wife reports that his symptoms have gradually worsened and are now associated with fever as well as redness in his urogenital region. The patient complained of symptoms urinary retention and incomplete emptying, and this was confirmed by CT and ultrasound of the abdomen and pelvis. Torres catheter was placed with minimal resistance by ED staff, and bloody urine was collected and sent for culture with sensitivity. Labs collected while in the ED were notable for leukocytosis with left shift, mild anemia, and borderline hyperglycemia. Patient will require admission for IV antibiotics and urological consultation. History obtained from patient and review of [facility] chart  Old medical records show that patient has recently relocated from [place]. He does not have a PCP in this area and further reports that he does not undergo regular physical exams or health maintenance checks. REVIEW OF SYSTEMS:   Constitutional: Positive for fever,chills or night sweats  ENT: Negative for rhinorrhea, epistaxis, hoarseness, sore throat. Respiratory: Negative for shortness of breath,wheezing  Cardiovascular: Negative for chest pain, palpitations   Gastrointestinal: Negative for nausea, vomiting, diarrhea  Genitourinary: Positive for polyuria, dysuria, hesitancy, and gross hematuria  Hematologic/Lymphatic: Negative for bleeding tendency, easy bruising  Musculoskeletal: Positive for myalgias and arthralgias  Neurologic: Negative for confusion,dysarthria. Skin: Urogenital rash present x7 days PTA  Psychiatric: Negative for depression,anxiety, agitation. Endocrine: Negative for polydipsia,polyuria,heat /cold intolerance. Past Medical History:   has no past medical history on file. Past Surgical History:   has no past surgical history on file. Medications:  No current facility-administered medications on file prior to encounter. No current outpatient medications on file prior to encounter. Allergies:  No Known Allergies     Social History:  Patient Lives at home with his wife. He has recently relocated from [place] and has no PCP. He reports that he quit smoking greater than 5 years ago. He does not regularly consume alcoholic beverages        Family History:  family history is not on file. Physical Exam:  /64   Pulse 79   Temp 98.4 °F (36.9 °C) (Oral)   Resp 18   Ht 5' 8\" (1.727 m)   Wt 185 lb (83.9 kg)   SpO2 96%   BMI 28.13 kg/m²     General appearance:  Appears uncomfortable. Well nourished  Eyes: Sclera clear, pupils equal  ENT: Moist mucus membranes, no thrush. Trachea midline. Cardiovascular: Regular rhythm, normal S1, S2. No murmur, gallop, rub.  No edema in lower extremities  Respiratory: Clear to auscultation bilaterally, no wheeze, good inspiratory effort  Gastrointestinal: Abdomen soft with suprapubic tenderness; normal bowel sounds circumcised male with bilaterally descended testicles; erythema present on scrotum, base of penis, and suprapubic region; no fluctuance or crepitus; no eschar or evidence of necrosis  Musculoskeletal: No cyanosis in digits, neck supple  Neurology: Cranial nerves grossly intact. Alert and oriented in time, place and person. No speech or motor deficits  Psychiatry: Appropriate affect. Not agitated  Skin: See  exam  Brisk capillary refill, peripheral pulses palpable   Labs:  CBC:   Lab Results   Component Value Date    WBC 14.7 02/13/2021    RBC 4.12 02/13/2021    HGB 12.6 02/13/2021    HCT 37.4 02/13/2021    MCV 90.8 02/13/2021    MCH 30.5 02/13/2021    MCHC 33.6 02/13/2021    RDW 13.8 02/13/2021     02/13/2021    MPV 7.2 02/13/2021     BMP:    Lab Results   Component Value Date     02/13/2021    K 3.4 02/13/2021    CL 92 02/13/2021    CO2 30 02/13/2021    BUN 13 02/13/2021    CREATININE 1.0 02/13/2021    CALCIUM 8.7 02/13/2021    GFRAA >60 02/13/2021    LABGLOM >60 02/13/2021    GLUCOSE 141 02/13/2021     US SCROTUM AND TESTICLES   Final Result   No intratesticular mass. Normal Doppler vascularity. Small bilateral hydroceles. Epididymal cysts are noted. These have a benign appearance, cysts or   spermatic granuloma. CT ABDOMEN PELVIS W IV CONTRAST Additional Contrast? None   Final Result   Nonobstructing left nephrolithiasis. Nonspecific bladder wall thickening. The differential includes chronic   outlet obstruction and cystitis. EKG: To be obtained upon arrival to floor; not ordered in ED      Discussed case  with ED provider    Problem List  Active Problems:    Urinary tract infection with hematuria    Acute urinary retention    Hyperglycemia    Cellulitis of pubic region  Resolved Problems:    * No resolved hospital problems.  *        Assessment/Plan:     UTI with hematuria  Blood urine cultures collected  Patient initiated on IV cefepime every 12 hours Strict I's and O's  Patient aggressively hydrated in ED with IVF; gentle fluids to continue overnight    Acute urinary retention  Suspect L UTS  Unable to place three-way catheter for irrigation; Torres placed instead  Urology consult placed for further recommendations  Screening PSA lab work collected    Cellulitis of urogenital region  Imaging without evidence of necrotizing fasciitis  Vancomycin added to cefepime regimen to broaden gram-positive coverage  Flagyl added for anaerobic coverage  No need for urgent surgical consultation at this time    Borderline hyperglycemia  POC glucose 140 upon arrival  A1c scheduled to rule out covert DM  POC glucose checks x3 days with as needed Humalog SSI      DVT prophylaxisBLE SCD rather than chemical anticoagulation due to gross hematuria with anemia  Code statusfull code  Dietmild carb restriction, ROSA  IV accessPIV established in ED  Torres Catheterplaced in ED    Admit as inpatient. I anticipate hospitalization spanning more than two midnights for investigation and treatment of the above medically necessary diagnoses. Please note that some part of this chart was generated using Dragon dictation software. Although every effort was made to ensure the accuracy of this automated transcription, some errors in transcription may have occurred inadvertently. If you may need any clarification, please do not hesitate to contact me through [facility].        Steve Solorzano MD    2/13/2021 9:59 PM

## 2021-02-14 NOTE — DISCHARGE INSTR - DIET

## 2021-02-14 NOTE — CONSULTS
Urology Attending Consult Note      Reason for Consultation: AUR, hematuria    History: This is a 69 y.o. male who presented with hematuria, dysuria, frequency. Urology is consulted for evaluation of AUR, hematuria. Urologic history - never seen yurologist before. New to the area. Long smoking history. .     Imaging:   CT A/P  Nonobstructing left nephrolithiasis.       Nonspecific bladder wall thickening.  The differential includes chronic   outlet obstruction and cystitis. Scrotal US  No intratesticular mass.  Normal Doppler vascularity.       Small bilateral hydroceles.       Epididymal cysts are noted.  These have a benign appearance, cysts or   spermatic granuloma. Family History, Social History, Review of Systems:  Reviewed and agreed to as per chart    Vitals:  /73   Pulse 81   Temp 98.1 °F (36.7 °C)   Resp 16   Ht 5' 8\" (1.727 m)   Wt 200 lb 1.6 oz (90.8 kg)   SpO2 93%   BMI 30.43 kg/m²   Temp  Av.7 °F (37.1 °C)  Min: 98.1 °F (36.7 °C)  Max: 99.8 °F (37.7 °C)    Intake/Output Summary (Last 24 hours) at 2021 1218  Last data filed at 2021 0516  Gross per 24 hour   Intake 1260 ml   Output 3400 ml   Net -2140 ml         Physical:  ? Well developed, well nourished in no acute distress  ? Mood indicates no abnormalities. Pt doesnt appear depressed  ? Orientated to time and place  ? Neck is supple, trachea is midline  ? Respiratory effort is normal  ? Cardiovascular show no extremity swelling  ? Abdomen no masses or hernias are palpated, there is no tenderness. Liver and Spleen appear normal.  ? Skin show no abnormal lesions  ? Lymph nodes are not palpated in the inguinal, neck, or axillary area. Male :  ? Penis appears normal and circumcised  ? Urethral meatus is normal in size and location  ?  Scrotum appears normal and both testicles appear normal in size and location  Slight erythema SP area, penis - no crepitus  Urine clear in villarreal, some blood around villarreal Labs:  WBC:    Lab Results   Component Value Date    WBC 10.0 02/14/2021     Hemoglobin/Hematocrit:    Lab Results   Component Value Date    HGB 11.2 02/14/2021    HCT 32.8 02/14/2021     BMP:    Lab Results   Component Value Date     02/14/2021    K 3.2 02/14/2021     02/14/2021    CO2 30 02/14/2021    BUN 11 02/14/2021    LABALBU 3.3 02/14/2021    CREATININE 0.9 02/14/2021    CALCIUM 8.2 02/14/2021    GFRAA >60 02/14/2021    LABGLOM >60 02/14/2021     PT/INR:  No results found for: PROTIME, INR  PTT:  No results found for: APTT[APTT        Impression/Plan: This is a 69 y.o. male who presented with hematuria and retention.      -- discharge with villarreal in place - will remove in 7-10 days, allow for period of bladder rest - some blood around catheter expected, likely some minor villarreal trauma, urine is clear  -- start Flomax 0.4mg daily and discharge with script for this  -- would give abx for discharge for questionable cystitis on CT, also redness in penile and SP area  -- will need outpatient cystoscopy for hematuria - my  will set this up in coming weeks  -- can be discharge today for  standpoint - pt eager to go home     Thank you for the opportunity to be involved in the care of this patient - please call with questions    Francesca Rowley MD  [facility]  Office - [phone]

## 2021-02-15 LAB
ESTIMATED AVERAGE GLUCOSE: 116.9 MG/DL
HBA1C MFR BLD: 5.7 %

## 2021-02-15 NOTE — ED NOTES
Lab called with + BC x 1 gram + Cocci in clusters. Patient was admitted and discharged on Bactrim antibiotic. This result is in as a preliminary read. Final result to be reviewed with sensitivity and further instruction.        Darlin Tan RN  03/02/21 [id number]

## 2021-02-16 LAB
ORGANISM: ABNORMAL
URINE CULTURE, ROUTINE: ABNORMAL

## 2021-02-17 LAB
BLOOD CULTURE, ROUTINE: NORMAL
CULTURE, BLOOD 2: ABNORMAL
ORGANISM: ABNORMAL

## 2021-03-15 NOTE — DISCHARGE SUMMARY
Hospital Medicine Discharge Summary    Patient ID: Celia Rust      Patient's PCP: No primary care provider on file. Admit Date: 2/13/2021     Discharge Date: 2/14/2021      Admitting Physician: Gloria Forte MD     Discharge Physician: EMILIA Whitaker - CNP     Discharge Diagnoses: Active Hospital Problems    Diagnosis    Urinary tract infection with hematuria [N39.0, R31.9]    Acute urinary retention [R33.8]    Hyperglycemia [R73.9]    Cellulitis of pubic region [P97.698]       The patient was seen and examined on day of discharge and this discharge summary is in conjunction with any daily progress note from day of discharge. Hospital Course:     Celia Rust is a 69 y.o. male who presented to the ED to be evaluated for 1 week history of intermittent hematuria, urinary frequency, dysuria, and scrotal pain. His wife reports that his symptoms have gradually worsened and are now associated with fever as well as redness in his urogenital region. The patient complained of symptoms urinary retention and incomplete emptying, and this was confirmed by CT and ultrasound of the abdomen and pelvis. Torres catheter was placed with minimal resistance by ED staff, and bloody urine was collected and sent for culture with sensitivity. Labs collected while in the ED were notable for leukocytosis with left shift, mild anemia, and borderline hyperglycemia. UTI with hematuria  Blood and urine cultures pending. Patient initiated on IV cefepime every 12 hours. Discharged home on Bactrim.       Acute urinary retention  Suspect L UTS  Unable to place three-way catheter for irrigation; Torres placed instead  Urology consulted.     --started on Flomax.     Cellulitis of urogenital region  Imaging without evidence of necrotizing fasciitis  Vancomycin added to cefepime regimen to broaden gram-positive coverage  Flagyl added for anaerobic coverage  No need for urgent surgical consultation at this time     Borderline hyperglycemia  POC glucose 140 upon arrival  A1c scheduled to rule out covert DM  POC glucose checks x3 days with as needed Humalog SSI      Physical Exam Performed:     /73   Pulse 85   Temp 98.6 °F (37 °C) (Oral)   Resp 14   Ht 5' 8\" (1.727 m)   Wt 200 lb 1.6 oz (90.8 kg)   SpO2 93%   BMI 30.43 kg/m²       General appearance:  No apparent distress, appears stated age and cooperative. HEENT:  Normal cephalic, atraumatic without obvious deformity. Pupils equal, round, and reactive to light. Extra ocular muscles intact. Conjunctivae/corneas clear. Neck: Supple, with full range of motion. No jugular venous distention. Trachea midline. Respiratory:  Normal respiratory effort. Clear to auscultation, bilaterally without Rales/Wheezes/Rhonchi. Cardiovascular:  Regular rate and rhythm with normal S1/S2 without murmurs, rubs or gallops. Abdomen: Soft, non-tender, non-distended with normal bowel sounds. Musculoskeletal:  No clubbing, cyanosis or edema bilaterally. Full range of motion without deformity. Skin: Skin color, texture, turgor normal.  No rashes or lesions. Neurologic:  Neurovascularly intact without any focal sensory/motor deficits. Cranial nerves: II-XII intact, grossly non-focal.  Psychiatric:  Alert and oriented, thought content appropriate, normal insight  Capillary Refill: Brisk,< 3 seconds   Peripheral Pulses: +2 palpable, equal bilaterally       Labs:  For convenience and continuity at follow-up the following most recent labs are provided:      CBC:    Lab Results   Component Value Date    WBC 10.0 02/14/2021    HGB 11.2 02/14/2021    HCT 32.8 02/14/2021     02/14/2021       Renal:    Lab Results   Component Value Date     02/14/2021    K 3.2 02/14/2021     02/14/2021    CO2 30 02/14/2021    BUN 11 02/14/2021    CREATININE 0.9 02/14/2021    CALCIUM 8.2 02/14/2021         Significant Diagnostic Studies    Radiology:   [address] [place] [address] TESTICLES   Final Result   No intratesticular mass. Normal Doppler vascularity. Small bilateral hydroceles. Epididymal cysts are noted. These have a benign appearance, cysts or   spermatic granuloma. CT ABDOMEN PELVIS W IV CONTRAST Additional Contrast? None   Final Result   Nonobstructing left nephrolithiasis. Nonspecific bladder wall thickening. The differential includes chronic   outlet obstruction and cystitis. Consults:     IP CONSULT TO UROLOGY  IP CONSULT TO HOSPITALIST  IP CONSULT TO PHARMACY    Disposition:  Home     Condition at Discharge: Stable    Discharge Instructions/Follow-up:  F/u with PCP in 1-2 weeks. F/u with urology in 2 weeks. Code Status:  Full    Activity: activity as tolerated    Diet: regular diet      Discharge Medications:     Discharge Medication List as of 2/14/2021  2:43 PM           Details   tamsulosin (FLOMAX) 0.4 MG capsule Take 1 capsule by mouth daily, Disp-30 capsule, R-3Normal      sulfamethoxazole-trimethoprim (BACTRIM DS;SEPTRA DS) 800-160 MG per tablet Take 1 tablet by mouth 2 times daily for 7 days, Disp-14 tablet, R-0Normal             Time Spent on discharge is more than 30 minutes in the examination, evaluation, counseling and review of medications and discharge plan. Signed:    EMILIA Lutz - CNP   3/15/2021      Thank you No primary care provider on file. for the opportunity to be involved in this patient's care. If you have any questions or concerns please feel free to contact me at [phone].

## 2022-07-16 ENCOUNTER — APPOINTMENT (OUTPATIENT)
Dept: GENERAL RADIOLOGY | Age: 72
End: 2022-07-16
Payer: MEDICARE

## 2022-07-16 ENCOUNTER — APPOINTMENT (OUTPATIENT)
Dept: CT IMAGING | Age: 72
End: 2022-07-16
Payer: MEDICARE

## 2022-07-16 ENCOUNTER — HOSPITAL ENCOUNTER (EMERGENCY)
Age: 72
Discharge: HOME | End: 2022-07-16
Payer: MEDICARE

## 2022-07-16 VITALS
TEMPERATURE: 98.4 F | DIASTOLIC BLOOD PRESSURE: 62 MMHG | OXYGEN SATURATION: 98 % | SYSTOLIC BLOOD PRESSURE: 105 MMHG | RESPIRATION RATE: 16 BRPM | HEART RATE: 74 BPM

## 2022-07-16 DIAGNOSIS — S09.90XA CLOSED HEAD INJURY, INITIAL ENCOUNTER: ICD-10-CM
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
## 2022-07-16 DIAGNOSIS — S46.911A STRAIN OF RIGHT SHOULDER, INITIAL ENCOUNTER: ICD-10-CM

## 2022-07-16 LAB
EKG ATRIAL RATE: 70 BPM
EKG DIAGNOSIS: NORMAL
EKG P AXIS: 36 DEGREES
EKG P-R INTERVAL: 136 MS
EKG Q-T INTERVAL: 390 MS
EKG QRS DURATION: 88 MS
EKG QTC CALCULATION (BAZETT): 421 MS
EKG R AXIS: -22 DEGREES
EKG T AXIS: -2 DEGREES
EKG VENTRICULAR RATE: 70 BPM

## 2022-07-16 PROCEDURE — 73030 X-RAY EXAM OF SHOULDER: CPT

## 2022-07-16 PROCEDURE — 71046 X-RAY EXAM CHEST 2 VIEWS: CPT

## 2022-07-16 PROCEDURE — 70450 CT HEAD/BRAIN W/O DYE: CPT

## 2022-07-16 PROCEDURE — 93005 ELECTROCARDIOGRAM TRACING: CPT | Performed by: NURSE PRACTITIONER

## 2022-07-16 PROCEDURE — 99284 EMERGENCY DEPT VISIT MOD MDM: CPT

## 2022-07-16 PROCEDURE — 93010 ELECTROCARDIOGRAM REPORT: CPT | Performed by: INTERNAL MEDICINE

## 2022-07-16 RX ORDER — HYDROCODONE BITARTRATE AND ACETAMINOPHEN 5; 325 MG/1; MG/1
1 TABLET ORAL EVERY 6 HOURS PRN
Qty: 8 TABLET | Refills: 0 | Status: SHIPPED | OUTPATIENT
Start: 2022-07-16 | End: 2022-07-19

## 2022-07-16 ASSESSMENT — PAIN SCALES - GENERAL: PAINLEVEL_OUTOF10: 2

## 2022-07-16 ASSESSMENT — PAIN DESCRIPTION - LOCATION: LOCATION: SHOULDER;CHEST

## 2022-07-16 ASSESSMENT — PAIN - FUNCTIONAL ASSESSMENT: PAIN_FUNCTIONAL_ASSESSMENT: 0-10

## 2022-07-16 NOTE — ED PROVIDER NOTES
[facility]  ED  EMERGENCY DEPARTMENT ENCOUNTER      This patient was not seen and evaluated by the attending physician. Pt Name: Mary Bragg  MRN: [id number]  [place] 1950  Date of evaluation: 7/16/2022  Provider: EMILIA Lechuga - CNP-C  PCP: No primary care provider on file. History provided by the patient. CHIEFCOMPLAINT:     Chief Complaint   Patient presents with    Fall     Pt states at approx ([phone] today, he fell while walking his dog. Adds that \"dog pulled him forward\", causing him to fall onto his chest. Denies hitting head or loss of consciousness. Also c/o right shoulder pain. HISTORY OF PRESENT ILLNESS:      Mary Bragg is a 67 y.o. male who presents to [address] [place] [address]  ED with complaints of fall. Patient states that he was outside walking his dog when his dog took off to go play with another dog, pulled him down. He states that he landed on his chest, complaining of chest discomfort, he did hit his head but denied loss of consciousness, is not anticoagulated. He states he is a chronically bad right shoulder is complaining of right shoulder pain as well. He has no obvious deformities, no other injuries he was able to ambulate afterwards. He is resting comfortably in bed, here for further evaluation. LOCATION:chest, shoulder  QUALITY:ache  SEVERITY:2  DURATION:today  MODIFYING FACTORS:worse with palpation. Nursing Notes were reviewed     REVIEW OF SYSTEMS:     Review of Systems  All systems, a total of 10, are reviewed and negative except for those that were just noted in history present illness. PAST MEDICAL HISTORY:   History reviewed. No pertinent past medical history. SURGICAL HISTORY:    History reviewed. No pertinent surgical history.       CURRENT MEDICATIONS:       Discharge Medication List as of 7/16/2022  4:45 PM        CONTINUE these medications which have NOT CHANGED    Details   tamsulosin (FLOMAX) 0.4 MG capsule Take 1 capsule by mouth daily, Disp-30 capsule, R-3Normal               ALLERGIES:    Patient has no known allergies. FAMILY HISTORY:     History reviewed. No pertinent family history. SOCIAL HISTORY:     Social History     Socioeconomic History    Marital status:      Spouse name: None    Number of children: None    Years of education: None    Highest education level: None       SCREENINGS:             PHYSICAL EXAM:       ED Triage Vitals [07/16/22 1406]   BP Temp Temp Source Heart Rate Resp SpO2 Height Weight   121/77 98.4 °F (36.9 °C) Oral 85 20 97 % -- --       Physical Exam    CONSTITUTIONAL: Awake and alert. Cooperative. Well-developed. Well-nourished. Vitals:    07/16/22 1406 07/16/22 1609   BP: 121/77 105/62   Pulse: 85 74   Resp: 20 16   Temp: 98.4 °F (36.9 °C)    TempSrc: Oral    SpO2: 97% 98%     HENT: Normocephalic. Atraumatic. External ears normal, without discharge. TMs clear bilaterally. Nonasal discharge. Oropharynx clear, no erythema. Mucous membranes moist.  EYES: Conjunctiva non-injected, nolid abnormalities noted. No scleral icterus. PERRL. EOM's grossly intact. Anterior chambers clear. NECK: Supple. Normal ROM. No meningismus. No thyroid tenderness or swelling noted. CARDIOVASCULAR: RRR. No Murmer. No carotid bruits. PULMONARY/CHEST WALL: Effort normal. No tachypnea. Lungs clear to ausculation. ABDOMEN: Normal BS. Soft. Nondistended. No tenderness to palpation. No guarding. No hernias noted. No splenomegaly. Back: Spine is midline. No ecchymosis. No crepituson palpation. No obvious subluxation of vertebral column. No saddle anesthesia or evidence of cauda equina. /ANORECTAL: Not assessed  MUSKULOSKELETAL: Normal ROM. No acute deformities. No edema. No tenderness to palpate. Mild pain with range of motion of right shoulder. SKIN: Warm and dry. NEUROLOGICAL:  GCS 15. CN II-XII grossly intact. Strength is 5/5 in all extremities and sensation is intact. PSYCHIATRIC: Normal affect, normal insight and judgement. Alert and oriented x 3. DIAGNOSTIC RESULTS:     LABS:    Results for orders placed or performed during the hospital encounter of 07/16/22   EKG 12 Lead   Result Value Ref Range    Ventricular Rate 70 BPM    Atrial Rate 70 BPM    P-R Interval 136 ms    QRS Duration 88 ms    Q-T Interval 390 ms    QTc Calculation (Bazett) 421 ms    P Axis 36 degrees    R Axis -22 degrees    T Axis -2 degrees    Diagnosis       Normal sinus rhythmNonspecific T wave abnormalityAbnormal ECGWhen compared with ECG of 14-FEB-2021 06:50,Nonspecific T wave abnormality now evident in Anterolateral leadsConfirmed by Jayla Pelletier ([id number]) on 7/16/2022 3:33:33 PM         RADIOLOGY:  All x-ray studies are viewed/reviewed by me. Formal interpretations per the radiologist are as follows:      CT HEAD WO CONTRAST   Final Result   No acute intracranial abnormality. XR CHEST (2 VW)   Final Result   No acute abnormality. XR SHOULDER RIGHT (MIN 2 VIEWS)   Final Result   Widening of the acromioclavicular joint. AC separation is possible. Alternatively, there may be chronic foreshortening of the distal clavicle. (No baseline studies are available.)      No acute osseous injury. EKG:  See EKG interpretation by an attending physician. PROCEDURES:   N/A    CRITICAL CARE TIME:   N/A    CONSULTS:  None      EMERGENCY DEPARTMENT COURSE andDIFFERENTIAL DIAGNOSIS/MDM:   Vitals:    Vitals:    07/16/22 1406 07/16/22 1609   BP: 121/77 105/62   Pulse: 85 74   Resp: 20 16   Temp: 98.4 °F (36.9 °C)    TempSrc: Oral    SpO2: 97% 98%       Patient wasgiven the following medications:  Medications - No data to display      Patient was evaluated independently by myself with the attending physician available for consultation. Patient presented to the emergency department today after a fall, states that his dog pulled him down.   Is complaining of right shoulder pain although he is got some chronic issues with his right shoulder, x-ray suggested a possible AC separation but could also be chronic in nature given his surgical history. Chest x-ray was negative. Head CT negative. He is instructed to rest and ice, he was discharged in good condition, I did not see any indications for further evaluation or work-up, patient was able to ambulate without difficulty. He was discharged home in good condition. Patient laboratory studies, radiographic imaging, and assessment were all discussed with the patient and/orpatient family. There was shared decision-making between myself as well as the patient and/or their surrogate and we are all in agreement with discharge home. There was an opportunity for questions and all questions were answered tothe best of my ability and to the satisfaction of the patient and/or patient family. FINAL IMPRESSION:      1. Fall, initial encounter    2. Closed head injury, initial encounter    3. Strain of right shoulder, initial encounter          DISPOSITION/PLAN:   DISPOSITION Decision To Discharge      PATIENT REFERRED TO:  [facility]  ED  [address] [facility]  Go to   If symptoms worsen    DISCHARGE MEDICATIONS:  Discharge Medication List as of 7/16/2022  4:45 PM        START taking these medications    Details   HYDROcodone-acetaminophen (NORCO) 5-325 MG per tablet Take 1 tablet by mouth every 6 hours as needed for Pain for up to 3 days. , Disp-8 tablet, R-0Normal                        (Please note thatportions of this note were completed with a voice recognition program.  Efforts were made to edit the dictations, but occasionally words are mis-transcribed.)    EMILIA Qureshi - CNP-C (electronicallysigned)        EMILIA Qureshi CNP  07/17/22 [id number]

## 2022-07-16 NOTE — ED NOTES
Patient ambulated in hallway without any episodes of dizziness. With slight limp from prior injury but steady gait at this time.      Aris Rutledge RN  07/16/22 1078

## 2022-12-06 ENCOUNTER — NURSE TRIAGE (OUTPATIENT)
Dept: OTHER | Facility: CLINIC | Age: 72
End: 2022-12-06

## 2022-12-06 NOTE — TELEPHONE ENCOUNTER
Location of patient: Xiomara Sarah call from [facility] at [employer] with Banyan. Subjective: Caller states \"We're trying to establish care and one of the things I know about him is that he has elevated BP.  156/70, 156-83 is a recent BP. \"     Onset:   months to years    Pain Severity:   no pain    Temperature:  no fever    What has been tried: nothing so far    Recommended disposition: See in Office Within 2 Weeks    Care advice provided, patient verbalizes understanding; denies any other questions or concerns; instructed to call back for any new or worsening symptoms. Robe Gilliland, Service Expert, is working to get an appt for pt. Attention Provider: Thank you for allowing me to participate in the care of your patient. The patient was connected to triage in response to information provided to the E[facility]/PSC. Please do not respond through this encounter as the response is not directed to a shared pool.       Reason for Disposition   [9] Systolic BP  >= 015 OR Diastolic >= 80 AND [1] taking BP medications    Protocols used: Blood Pressure - High-ADULT-

## 2023-01-01 ENCOUNTER — TELEPHONE (OUTPATIENT)
Dept: SURGERY | Age: 73
End: 2023-01-01

## 2023-01-09 ENCOUNTER — OFFICE VISIT (OUTPATIENT)
Dept: INTERNAL MEDICINE CLINIC | Age: 73
End: 2023-01-09
Payer: MEDICARE

## 2023-01-09 VITALS
DIASTOLIC BLOOD PRESSURE: 80 MMHG | WEIGHT: 199 LBS | HEART RATE: 80 BPM | OXYGEN SATURATION: 98 % | SYSTOLIC BLOOD PRESSURE: 146 MMHG | BODY MASS INDEX: 29.47 KG/M2 | HEIGHT: 69 IN

## 2023-01-09 DIAGNOSIS — I10 PRIMARY HYPERTENSION: ICD-10-CM

## 2023-01-09 DIAGNOSIS — N40.1 BENIGN PROSTATIC HYPERPLASIA WITH INCOMPLETE BLADDER EMPTYING: ICD-10-CM

## 2023-01-09 DIAGNOSIS — Z00.00 INITIAL MEDICARE ANNUAL WELLNESS VISIT: Primary | ICD-10-CM

## 2023-01-09 DIAGNOSIS — Z23 NEED FOR INFLUENZA VACCINATION: ICD-10-CM

## 2023-01-09 DIAGNOSIS — Z12.11 SCREEN FOR COLON CANCER: ICD-10-CM

## 2023-01-09 DIAGNOSIS — R39.14 BENIGN PROSTATIC HYPERPLASIA WITH INCOMPLETE BLADDER EMPTYING: ICD-10-CM

## 2023-01-09 PROBLEM — R33.8 ACUTE URINARY RETENTION: Status: RESOLVED | Noted: 2021-02-13 | Resolved: 2023-01-01

## 2023-01-09 PROBLEM — L03.319 CELLULITIS OF PUBIC REGION: Status: RESOLVED | Noted: 2021-02-13 | Resolved: 2023-01-09

## 2023-01-09 PROBLEM — R31.9 URINARY TRACT INFECTION WITH HEMATURIA: Status: RESOLVED | Noted: 2021-02-13 | Resolved: 2023-01-09

## 2023-01-09 PROBLEM — N39.0 URINARY TRACT INFECTION WITH HEMATURIA: Status: RESOLVED | Noted: 2021-02-13 | Resolved: 2023-01-09

## 2023-01-09 LAB
A/G RATIO: 2 (ref 1.1–2.2)
ALBUMIN SERPL-MCNC: 4.3 G/DL (ref 3.4–5)
ALP BLD-CCNC: 90 U/L (ref 40–129)
ALT SERPL-CCNC: 17 U/L (ref 10–40)
ANION GAP SERPL CALCULATED.3IONS-SCNC: 13 MMOL/L (ref 3–16)
AST SERPL-CCNC: 20 U/L (ref 15–37)
BASOPHILS ABSOLUTE: 0 K/UL (ref 0–0.2)
BASOPHILS RELATIVE PERCENT: 0.4 %
BILIRUB SERPL-MCNC: 0.4 MG/DL (ref 0–1)
BUN BLDV-MCNC: 13 MG/DL (ref 7–20)
CALCIUM SERPL-MCNC: 9.5 MG/DL (ref 8.3–10.6)
CHLORIDE BLD-SCNC: 101 MMOL/L (ref 99–110)
CHOLESTEROL, TOTAL: 238 MG/DL (ref 0–199)
CO2: 28 MMOL/L (ref 21–32)
CREAT SERPL-MCNC: 0.8 MG/DL (ref 0.8–1.3)
EOSINOPHILS ABSOLUTE: 0 K/UL (ref 0–0.6)
EOSINOPHILS RELATIVE PERCENT: 0.5 %
GFR SERPL CREATININE-BSD FRML MDRD: >60 ML/MIN/{1.73_M2}
GLUCOSE BLD-MCNC: 94 MG/DL (ref 70–99)
HCT VFR BLD CALC: 43 % (ref 40.5–52.5)
HDLC SERPL-MCNC: 59 MG/DL (ref 40–60)
HEMOGLOBIN: 13.9 G/DL (ref 13.5–17.5)
LDL CHOLESTEROL CALCULATED: 158 MG/DL
LYMPHOCYTES ABSOLUTE: 0.7 K/UL (ref 1–5.1)
LYMPHOCYTES RELATIVE PERCENT: 7.3 %
MCH RBC QN AUTO: 29 PG (ref 26–34)
MCHC RBC AUTO-ENTMCNC: 32.4 G/DL (ref 31–36)
MCV RBC AUTO: 89.5 FL (ref 80–100)
MONOCYTES ABSOLUTE: 0.5 K/UL (ref 0–1.3)
MONOCYTES RELATIVE PERCENT: 6 %
NEUTROPHILS ABSOLUTE: 7.7 K/UL (ref 1.7–7.7)
NEUTROPHILS RELATIVE PERCENT: 85.8 %
PDW BLD-RTO: 14.4 % (ref 12.4–15.4)
PLATELET # BLD: 185 K/UL (ref 135–450)
PMV BLD AUTO: 9.1 FL (ref 5–10.5)
POTASSIUM SERPL-SCNC: 4.6 MMOL/L (ref 3.5–5.1)
RBC # BLD: 4.8 M/UL (ref 4.2–5.9)
SODIUM BLD-SCNC: 142 MMOL/L (ref 136–145)
TOTAL PROTEIN: 6.5 G/DL (ref 6.4–8.2)
TRIGL SERPL-MCNC: 106 MG/DL (ref 0–150)
TSH SERPL DL<=0.05 MIU/L-ACNC: 1.22 UIU/ML (ref 0.27–4.2)
VLDLC SERPL CALC-MCNC: 21 MG/DL
WBC # BLD: 9 K/UL (ref 4–11)

## 2023-01-09 PROCEDURE — 3079F DIAST BP 80-89 MM HG: CPT | Performed by: INTERNAL MEDICINE

## 2023-01-09 PROCEDURE — G8417 CALC BMI ABV UP PARAM F/U: HCPCS | Performed by: INTERNAL MEDICINE

## 2023-01-09 PROCEDURE — G0438 PPPS, INITIAL VISIT: HCPCS | Performed by: INTERNAL MEDICINE

## 2023-01-09 PROCEDURE — 90677 PCV20 VACCINE IM: CPT | Performed by: INTERNAL MEDICINE

## 2023-01-09 PROCEDURE — 3077F SYST BP >= 140 MM HG: CPT | Performed by: INTERNAL MEDICINE

## 2023-01-09 PROCEDURE — 99203 OFFICE O/P NEW LOW 30 MIN: CPT | Performed by: INTERNAL MEDICINE

## 2023-01-09 PROCEDURE — G8427 DOCREV CUR MEDS BY ELIG CLIN: HCPCS | Performed by: INTERNAL MEDICINE

## 2023-01-09 PROCEDURE — 36415 COLL VENOUS BLD VENIPUNCTURE: CPT | Performed by: INTERNAL MEDICINE

## 2023-01-09 PROCEDURE — G0009 ADMIN PNEUMOCOCCAL VACCINE: HCPCS | Performed by: INTERNAL MEDICINE

## 2023-01-09 PROCEDURE — 3017F COLORECTAL CA SCREEN DOC REV: CPT | Performed by: INTERNAL MEDICINE

## 2023-01-09 PROCEDURE — 1036F TOBACCO NON-USER: CPT | Performed by: INTERNAL MEDICINE

## 2023-01-09 PROCEDURE — G8484 FLU IMMUNIZE NO ADMIN: HCPCS | Performed by: INTERNAL MEDICINE

## 2023-01-09 PROCEDURE — 1123F ACP DISCUSS/DSCN MKR DOCD: CPT | Performed by: INTERNAL MEDICINE

## 2023-01-09 RX ORDER — DOXAZOSIN MESYLATE 4 MG/1
4 TABLET ORAL DAILY
Qty: 30 TABLET | Refills: 0 | Status: SHIPPED | OUTPATIENT
Start: 2023-01-09

## 2023-01-09 SDOH — ECONOMIC STABILITY: FOOD INSECURITY: WITHIN THE PAST 12 MONTHS, THE FOOD YOU BOUGHT JUST DIDN'T LAST AND YOU DIDN'T HAVE MONEY TO GET MORE.: NEVER TRUE

## 2023-01-09 SDOH — ECONOMIC STABILITY: FOOD INSECURITY: WITHIN THE PAST 12 MONTHS, YOU WORRIED THAT YOUR FOOD WOULD RUN OUT BEFORE YOU GOT MONEY TO BUY MORE.: NEVER TRUE

## 2023-01-09 ASSESSMENT — PATIENT HEALTH QUESTIONNAIRE - PHQ9
SUM OF ALL RESPONSES TO PHQ QUESTIONS 1-9: 0
1. LITTLE INTEREST OR PLEASURE IN DOING THINGS: 0
SUM OF ALL RESPONSES TO PHQ9 QUESTIONS 1 & 2: 0
2. FEELING DOWN, DEPRESSED OR HOPELESS: 0
SUM OF ALL RESPONSES TO PHQ QUESTIONS 1-9: 0

## 2023-01-09 ASSESSMENT — LIFESTYLE VARIABLES
HOW MANY STANDARD DRINKS CONTAINING ALCOHOL DO YOU HAVE ON A TYPICAL DAY: 1 OR 2
HOW OFTEN DO YOU HAVE A DRINK CONTAINING ALCOHOL: MONTHLY OR LESS

## 2023-01-09 ASSESSMENT — SOCIAL DETERMINANTS OF HEALTH (SDOH): HOW HARD IS IT FOR YOU TO PAY FOR THE VERY BASICS LIKE FOOD, HOUSING, MEDICAL CARE, AND HEATING?: NOT HARD AT ALL

## 2023-01-09 NOTE — PROGRESS NOTES
Lissett Jasmine  YOB: 1950    Date of Service:  1/9/2023    Chief Complaint:      Chief Complaint   Patient presents with    New Patient    Medicare AW       Assessment/Plan:  Yakelin Patel was seen today for new patient and medicare awv. Diagnoses and all orders for this visit:    Initial Medicare annual wellness visit    Need for influenza vaccination  -     Pneumococcal, PCV20, PREVNAR 20, (age 25 yrs+), IM, PF    Screen for colon cancer  -     Fecal DNA Colorectal cancer screening (Cologuard)    Primary hypertension  Start doxazosin (CARDURA) 4 MG tablet; Take 1 tablet by mouth daily  -     Lipid Panel  -     Comprehensive Metabolic Panel  -     CBC with Auto Differential  -     TSH    Benign prostatic hyperplasia with incomplete bladder emptying  Start doxazosin (CARDURA) 4 MG tablet; Take 1 tablet by mouth daily      Return BP and BPH 2/6 at 1:50. HPI:  Lissett Jasmine is a 67 y.o. His blood pressure has always been a little high. Never been on medications in the past.  BPH:  he stop flomax over a year ago and stream is not very strong and feels like he has to go again after going. No nocturia.     Lab Results   Component Value Date    LABA1C 5.7 02/14/2021    LABMICR YES 02/13/2021     Lab Results   Component Value Date     02/14/2021    K 3.2 (L) 02/14/2021     02/14/2021    CO2 30 02/14/2021    BUN 11 02/14/2021    CREATININE 0.9 02/14/2021    GLUCOSE 117 (H) 02/14/2021    CALCIUM 8.2 (L) 02/14/2021     Lab Results   Component Value Date/Time    CHOL 181 02/14/2021 06:06 AM    TRIG 122 02/14/2021 06:06 AM    HDL 31 02/14/2021 06:06 AM    LDLCALC 126 02/14/2021 06:06 AM     Lab Results   Component Value Date    ALT 23 02/14/2021    AST 23 02/14/2021     No results found for: TSH, T4FREE, T3FREE  Lab Results   Component Value Date    WBC 10.0 02/14/2021    HGB 11.2 (L) 02/14/2021    HCT 32.8 (L) 02/14/2021    MCV 90.7 02/14/2021     02/14/2021     No results found for: INR   Lab Results   Component Value Date    PSA 1.13 02/14/2021      No results found for: [facility]     Patient Active Problem List   Diagnosis    Urinary tract infection with hematuria    Acute urinary retention    Hyperglycemia    Cellulitis of pubic region       No Known Allergies  No outpatient medications have been marked as taking for the 1/9/23 encounter (Office Visit) with London Sullivan MD.         Review of Systems: 14 systems were negative except of what was stated on HPI    Nursing note and vitals reviewed. Vitals:    01/09/23 1336 01/09/23 1353   BP: (!) 156/86 (!) 146/80   Site: Left Upper Arm    Pulse: 80    SpO2: 98%    Weight: 199 lb (90.3 kg)    Height: 5' 9\" (1.753 m)      Wt Readings from Last 3 Encounters:   01/09/23 199 lb (90.3 kg)   02/14/21 200 lb 1.6 oz (90.8 kg)     BP Readings from Last 3 Encounters:   01/09/23 (!) 146/80   07/16/22 105/62   02/14/21 129/73     Body mass index is 29.39 kg/m². Constitutional: Patient appears well-developed and well-nourished. No distress. Head: Normocephalic and atraumatic. Neck: Normal range of motion. Neck supple. No thyroidmegaly. Cardiovascular: Normal rate, regular rhythm, normal heart sounds and intact distal pulses. Pulmonary/Chest: Effort normal and breath sounds normal. No stridor. No respiratory distress. No wheezes and no rales. Abdominal: Soft. Bowel sounds are normal. No distension and no mass. No tenderness. No rebound and no guarding. Musculoskeletal: No edema and no tenderness. Skin: No rash or erythema.

## 2023-01-09 NOTE — PATIENT INSTRUCTIONS
Preventing Falls: Care Instructions  Overview     Getting around your home safely can be a challenge if you have injuries or health problems that make it easy for you to fall. Loose rugs and furniture in walkways are among the dangers for many older people who have problems walking or who have poor eyesight. People who have conditions such as arthritis, osteoporosis, or dementia also have to be careful not to fall. You can make your home safer with a few simple measures. Follow-up care is a key part of your treatment and safety. Be sure to make and go to all appointments, and call your doctor if you are having problems. It's also a good idea to know your test results and keep a list of the medicines you take. How can you care for yourself at home? Taking care of yourself  Exercise regularly to improve your strength, muscle tone, and balance. Walk if you can. Swimming may be a good choice if you cannot walk easily. Have your vision and hearing checked each year or any time you notice a change. If you have trouble seeing and hearing, you might not be able to avoid objects and could lose your balance. Know the side effects of the medicines you take. Ask your doctor or pharmacist whether the medicines you take can affect your balance. Sleeping pills or sedatives can affect your balance. Limit the amount of alcohol you drink. Alcohol can impair your balance and other senses. Ask your doctor whether calluses or corns on your feet need to be removed. If you wear loose-fitting shoes because of calluses or corns, you can lose your balance and fall. Talk to your doctor if you have numbness in your feet. You may get dizzy if you do not drink enough water. To prevent dehydration, drink plenty of fluids. Choose water and other clear liquids. If you have kidney, heart, or liver disease and have to limit fluids, talk with your doctor before you increase the amount of fluids you drink.   Preventing falls at home  Remove raised doorway thresholds, throw rugs, and clutter. Repair loose carpet or raised areas in the floor. Move furniture and electrical cords to keep them out of walking paths. Use nonskid floor wax, and wipe up spills right away, especially on ceramic tile floors. If you use a walker or cane, put rubber tips on it. If you use crutches, clean the bottoms of them regularly with an abrasive pad, such as steel wool. Keep your house well lit, especially stairways, porches, and outside walkways. Use night-lights in areas such as hallways and bathrooms. Add extra light switches or use remote switches (such as switches that go on or off when you clap your hands) to make it easier to turn lights on if you have to get up during the night. Install sturdy handrails on stairways. Move items in your cabinets so that the things you use a lot are on the lower shelves (about waist level). Keep a cordless phone and a flashlight with new batteries by your bed. If possible, put a phone in each of the main rooms of your house, or carry a cell phone in case you fall and cannot reach a phone. Or, you can wear a device around your neck or wrist. You push a button that sends a signal for help. Wear low-heeled shoes that fit well and give your feet good support. Use footwear with nonskid soles. Check the heels and soles of your shoes for wear. Repair or replace worn heels or soles. Do not wear socks without shoes on smooth floors, such as wood. Walk on the grass when the sidewalks are slippery. If you live in an area that gets snow and ice in the winter, sprinkle salt on slippery steps and sidewalks. Or ask a family member or friend to do this for you. Preventing falls in the bath  Install grab bars and nonskid mats inside and outside your shower or tub and near the toilet and sinks. Use shower chairs and bath benches. Use a hand-held shower head that will allow you to sit while showering.   Get into a tub or shower by putting the weaker leg in first. Get out of a tub or shower with your strong side first.  Repair loose toilet seats and consider installing a raised toilet seat to make getting on and off the toilet easier. Keep your bathroom door unlocked while you are in the shower. Where can you learn more? Go to http://www.messina.com/ and enter G117 to learn more about \"Preventing Falls: Care Instructions. \"  Current as of: May 4, 2022               Content Version: 13.5  © 8141-1072 Healthwise, Incorporated. Care instructions adapted under license by [facility] ([facility]). If you have questions about a medical condition or this instruction, always ask your healthcare professional. [address] any warranty or liability for your use of this information. Hearing Loss: Care Instructions  Overview     Hearing loss is a sudden or slow decrease in how well you hear. It can range from mild to severe. Permanent hearing loss can occur with aging. It also can happen when you are exposed long-term to loud noise. Examples include listening to loud music, riding motorcycles, or being around other loud machines. Hearing loss can affect your work and home life. It can make you feel lonely or depressed. You may feel that you have lost your independence. But hearing aids and other devices can help you hear better and feel connected to others. Follow-up care is a key part of your treatment and safety. Be sure to make and go to all appointments, and call your doctor if you are having problems. It's also a good idea to know your test results and keep a list of the medicines you take. How can you care for yourself at home? Avoid loud noises whenever possible. This helps keep your hearing from getting worse. Always wear hearing protection around loud noises. Wear a hearing aid as directed. See a professional who can help you pick a hearing aid that fits you. Have hearing tests as your doctor suggests. They can show whether your hearing has changed. Your hearing aid may need to be adjusted. Use other devices as needed. These may include:  Telephone amplifiers and hearing aids that can connect to a television, stereo, radio, or microphone. Devices that use lights or vibrations. These alert you to the doorbell, a ringing telephone, or a baby monitor. Television closed-captioning. This shows the words at the bottom of the screen. Most new TVs can do this. TTY (text telephone). This lets you type messages back and forth on the telephone instead of talking or listening. These devices are also called TDD. When messages are typed on the keyboard, they are sent over the phone line to a receiving TTY. The message is shown on a monitor. Use text messaging, social media, and email if it is hard for you to communicate by telephone. Try to learn a listening technique called speechreading. It is not lipreading. You pay attention to people's gestures, expressions, posture, and tone of voice. These clues can help you understand what a person is saying. Face the person you are talking to, and have them face you. Make sure the lighting is good. You need to see the other person's face clearly. Think about counseling if you need help to adjust to your hearing loss. When should you call for help? Watch closely for changes in your health, and be sure to contact your doctor if:    You think your hearing is getting worse.     You have new symptoms, such as dizziness or nausea. Where can you learn more? Go to http://www.[employer].com/ and enter R798 to learn more about \"Hearing Loss: Care Instructions. \"  Current as of: May 4, 2022               Content Version: 13.5  © 2758-0827 Healthwise, Incorporated. Care instructions adapted under license by [facility] ([facility]).  If you have questions about a medical condition or this instruction, always ask your healthcare professional. Seth Nicholson any warranty or liability for your use of this information. Advance Directives: Care Instructions  Overview  An advance directive is a legal way to state your wishes at the end of your life. It tells your family and your doctor what to do if you can't say what you want. There are two main types of advance directives. You can change them any time your wishes change. Living will. This form tells your family and your doctor your wishes about life support and other treatment. The form is also called a declaration. Medical power of . This form lets you name a person to make treatment decisions for you when you can't speak for yourself. This person is called a health care agent (health care proxy, health care surrogate). The form is also called a durable power of  for health care. If you do not have an advance directive, decisions about your medical care may be made by a family member, or by a doctor or a  who doesn't know you. It may help to think of an advance directive as a gift to the people who care for you. If you have one, they won't have to make tough decisions by themselves. For more information, including forms for your state, see the [address] website (www.caringinfo.org/planning/advance-directives/). Follow-up care is a key part of your treatment and safety. Be sure to make and go to all appointments, and call your doctor if you are having problems. It's also a good idea to know your test results and keep a list of the medicines you take. What should you include in an advance directive? Many states have a unique advance directive form. (It may ask you to address specific issues.) Or you might use a universal form that's approved by many states. If your form doesn't tell you what to address, it may be hard to know what to include in your advance directive. Use the questions below to help you get started.   Who do you want to make decisions about your medical care if you are not able to? What life-support measures do you want if you have a serious illness that gets worse over time or can't be cured? What are you most afraid of that might happen? (Maybe you're afraid of having pain, losing your independence, or being kept alive by machines.)  Where would you prefer to die? (Your home? A hospital? A nursing home?)  Do you want to donate your organs when you die? Do you want certain Sabianism practices performed before you die? When should you call for help? Be sure to contact your doctor if you have any questions. Where can you learn more? Go to http://www.messina.com/ and enter R264 to learn more about \"Advance Directives: Care Instructions. \"  Current as of: June 16, 2022               Content Version: 13.5  © 4396-0682 Healthwise, Incorporated. Care instructions adapted under license by [facility] ([facility]). If you have questions about a medical condition or this instruction, always ask your healthcare professional. [place] [address] any warranty or liability for your use of this information. Personalized Preventive Plan for Lucina Saunders - 1/9/2023  Medicare offers a range of preventive health benefits. Some of the tests and screenings are paid in full while other may be subject to a deductible, co-insurance, and/or copay. Some of these benefits include a comprehensive review of your medical history including lifestyle, illnesses that may run in your family, and various assessments and screenings as appropriate. After reviewing your medical record and screening and assessments performed today your provider may have ordered immunizations, labs, imaging, and/or referrals for you. A list of these orders (if applicable) as well as your Preventive Care list are included within your After Visit Summary for your review.     Other Preventive Recommendations:    A preventive eye exam performed by an eye specialist is recommended every 1-2 years to screen for glaucoma; cataracts, macular degeneration, and other eye disorders. A preventive dental visit is recommended every 6 months. Try to get at least 150 minutes of exercise per week or 10,000 steps per day on a pedometer . Order or download the FREE \"Exercise & Physical Activity: Your Everyday Guide\" from [employer]. Call 3-696.326.8002 or search [employer] online. You need 8662-3732 mg of calcium and 7045-2041 IU of vitamin D per day. It is possible to meet your calcium requirement with diet alone, but a vitamin D supplement is usually necessary to meet this goal.  When exposed to the sun, use a sunscreen that protects against both UVA and UVB radiation with an SPF of 30 or greater. Reapply every 2 to 3 hours or after sweating, drying off with a towel, or swimming. Always wear a seat belt when traveling in a car. Always wear a helmet when riding a bicycle or motorcycle.

## 2023-01-09 NOTE — PROGRESS NOTES
Medicare Annual Wellness Visit    Rasta Barrientos is here for New Patient and Medicare AWV    Assessment & Plan   Initial Medicare annual wellness visit      Recommendations for Preventive Services Due: see orders and patient instructions/AVS.  Recommended screening schedule for the next 5-10 years is provided to the patient in written form: see Patient Instructions/AVS.     Return for Medicare Annual Wellness Visit in 1 year. Subjective       Patient's complete Health Risk Assessment and screening values have been reviewed and are found in Flowsheets. The following problems were reviewed today and where indicated follow up appointments were made and/or referrals ordered. Positive Risk Factor Screenings with Interventions:    Fall Risk:  Do you feel unsteady or are you worried about falling? : (!) yes (on occasion he feels unsteady)  2 or more falls in past year?: no  Fall with injury in past year?: no     Interventions:                    Hearing Screen:  Do you or your family notice any trouble with your hearing that hasn't been managed with hearing aids?: (!) Yes (hearing less lately)    Interventions:  Patient declines any further evaluation or treatment       Advanced Directives:  Do you have a Living Will?: (!) No (not sure, will have to check with wife)    Intervention:  Full code                       Objective   Vitals:    01/09/23 1336 01/09/23 1353   BP: (!) 156/86 (!) 146/80   Site: Left Upper Arm    Pulse: 80    SpO2: 98%    Weight: 199 lb (90.3 kg)    Height: 5' 9\" (1.753 m)       Body mass index is 29.39 kg/m². No Known Allergies  Prior to Visit Medications    Medication Sig Taking? Authorizing Provider   tamsulosin (FLOMAX) 0.4 MG capsule Take 1 capsule by mouth daily  Patient not taking: Reported on 1/9/2023  EMILIA Verdugo CNP       CareTeam (Including outside providers/suppliers regularly involved in providing care):    No care team member to display     Reviewed and updated this visit:  Tobacco  Allergies  Meds  Problems  Med Hx  Surg Hx  Soc Hx  Fam Hx

## 2023-02-06 ENCOUNTER — OFFICE VISIT (OUTPATIENT)
Dept: INTERNAL MEDICINE CLINIC | Age: 73
End: 2023-02-06
Payer: MEDICARE

## 2023-02-06 VITALS
HEART RATE: 84 BPM | OXYGEN SATURATION: 98 % | SYSTOLIC BLOOD PRESSURE: 126 MMHG | DIASTOLIC BLOOD PRESSURE: 70 MMHG | BODY MASS INDEX: 29.77 KG/M2 | HEIGHT: 69 IN | WEIGHT: 201 LBS

## 2023-02-06 DIAGNOSIS — N40.1 BENIGN PROSTATIC HYPERPLASIA WITH INCOMPLETE BLADDER EMPTYING: ICD-10-CM

## 2023-02-06 DIAGNOSIS — R39.14 BENIGN PROSTATIC HYPERPLASIA WITH INCOMPLETE BLADDER EMPTYING: ICD-10-CM

## 2023-02-06 DIAGNOSIS — I10 PRIMARY HYPERTENSION: ICD-10-CM

## 2023-02-06 PROCEDURE — G8484 FLU IMMUNIZE NO ADMIN: HCPCS | Performed by: INTERNAL MEDICINE

## 2023-02-06 PROCEDURE — 3074F SYST BP LT 130 MM HG: CPT | Performed by: INTERNAL MEDICINE

## 2023-02-06 PROCEDURE — 1036F TOBACCO NON-USER: CPT | Performed by: INTERNAL MEDICINE

## 2023-02-06 PROCEDURE — 3078F DIAST BP <80 MM HG: CPT | Performed by: INTERNAL MEDICINE

## 2023-02-06 PROCEDURE — 99213 OFFICE O/P EST LOW 20 MIN: CPT | Performed by: INTERNAL MEDICINE

## 2023-02-06 PROCEDURE — 3017F COLORECTAL CA SCREEN DOC REV: CPT | Performed by: INTERNAL MEDICINE

## 2023-02-06 PROCEDURE — G8417 CALC BMI ABV UP PARAM F/U: HCPCS | Performed by: INTERNAL MEDICINE

## 2023-02-06 PROCEDURE — 1123F ACP DISCUSS/DSCN MKR DOCD: CPT | Performed by: INTERNAL MEDICINE

## 2023-02-06 PROCEDURE — G8427 DOCREV CUR MEDS BY ELIG CLIN: HCPCS | Performed by: INTERNAL MEDICINE

## 2023-02-06 RX ORDER — DOXAZOSIN MESYLATE 4 MG/1
4 TABLET ORAL DAILY
Qty: 90 TABLET | Refills: 1 | Status: SHIPPED | OUTPATIENT
Start: 2023-02-06

## 2023-02-06 SDOH — ECONOMIC STABILITY: FOOD INSECURITY: WITHIN THE PAST 12 MONTHS, YOU WORRIED THAT YOUR FOOD WOULD RUN OUT BEFORE YOU GOT MONEY TO BUY MORE.: NEVER TRUE

## 2023-02-06 SDOH — ECONOMIC STABILITY: HOUSING INSECURITY
IN THE LAST 12 MONTHS, WAS THERE A TIME WHEN YOU DID NOT HAVE A STEADY PLACE TO SLEEP OR SLEPT IN A SHELTER (INCLUDING NOW)?: NO

## 2023-02-06 SDOH — ECONOMIC STABILITY: INCOME INSECURITY: HOW HARD IS IT FOR YOU TO PAY FOR THE VERY BASICS LIKE FOOD, HOUSING, MEDICAL CARE, AND HEATING?: NOT VERY HARD

## 2023-02-06 SDOH — ECONOMIC STABILITY: FOOD INSECURITY: WITHIN THE PAST 12 MONTHS, THE FOOD YOU BOUGHT JUST DIDN'T LAST AND YOU DIDN'T HAVE MONEY TO GET MORE.: NEVER TRUE

## 2023-02-06 NOTE — PROGRESS NOTES
Kirit Hurt  YOB: 1950    Date of Service:  2/6/2023    Chief Complaint:      Chief Complaint   Patient presents with    Hypertension    Benign Prostatic Hypertrophy       Assessment/Plan:  Bro Chaudhry was seen today for hypertension and benign prostatic hypertrophy. Diagnoses and all orders for this visit:    Primary hypertension  -     doxazosin (CARDURA) 4 MG tablet; Take 1 tablet by mouth daily    Benign prostatic hyperplasia with incomplete bladder emptying  -     doxazosin (CARDURA) 4 MG tablet; Take 1 tablet by mouth daily    Stable and continue on current medications. Return BP and BPH 6/26. HPI:  iKrit Hurt is a 67 y.o. Hypertension:  Home blood pressure monitoring: Yes - 130/74-80 on doxazosin 4 mg qhs. He is adherent to a low sodium diet. Patient denies chest pain, shortness of breath, headache, lightheadedness, blurred vision, peripheral edema, palpitations, dry cough, and fatigue. Antihypertensive medication side effects: no medication side effects noted. Use of agents associated with hypertension: none. BPH with slow stream:  improve on doxazosin 4 mg evening. No nocturia.     Lab Results   Component Value Date    LABA1C 5.7 02/14/2021    LABMICR YES 02/13/2021     Lab Results   Component Value Date     01/09/2023    K 4.6 01/09/2023     01/09/2023    CO2 28 01/09/2023    BUN 13 01/09/2023    CREATININE 0.8 01/09/2023    GLUCOSE 94 01/09/2023    CALCIUM 9.5 01/09/2023     Lab Results   Component Value Date/Time    CHOL 238 01/09/2023 02:26 PM    TRIG 106 01/09/2023 02:26 PM    HDL 59 01/09/2023 02:26 PM    LDLCALC 158 01/09/2023 02:26 PM     Lab Results   Component Value Date    ALT 17 01/09/2023    AST 20 01/09/2023     Lab Results   Component Value Date    TSH 1.22 01/09/2023     Lab Results   Component Value Date    WBC 9.0 01/09/2023    HGB 13.9 01/09/2023    HCT 43.0 01/09/2023    MCV 89.5 01/09/2023     01/09/2023     No results found for: INR   Lab Results   Component Value Date    PSA 1.13 02/14/2021      No results found for: [facility]     Patient Active Problem List   Diagnosis    Hyperglycemia       No Known Allergies  Outpatient Medications Marked as Taking for the 2/6/23 encounter (Office Visit) with Rd Finch MD   Medication Sig Dispense Refill    doxazosin (CARDURA) 4 MG tablet Take 1 tablet by mouth daily 30 tablet 0         Review of Systems: 14 systems were negative except of what was stated on HPI    Nursing note and vitals reviewed. Vitals:    02/06/23 1339 02/06/23 1342 02/06/23 1344   BP: (!) 155/84  126/70   Pulse:  84    SpO2:  98%    Weight: 201 lb (91.2 kg)     Height: 5' 9\" (1.753 m)       Wt Readings from Last 3 Encounters:   02/06/23 201 lb (91.2 kg)   01/09/23 199 lb (90.3 kg)   02/14/21 200 lb 1.6 oz (90.8 kg)     BP Readings from Last 3 Encounters:   02/06/23 126/70   01/09/23 (!) 146/80   07/16/22 105/62     Body mass index is 29.68 kg/m². Constitutional: Patient appears well-developed and well-nourished. No distress. Head: Normocephalic and atraumatic. Neck: Normal range of motion. Neck supple. No thyroidmegaly. Cardiovascular: Normal rate, regular rhythm, normal heart sounds and intact distal pulses. Pulmonary/Chest: Effort normal and breath sounds normal. No stridor. No respiratory distress. No wheezes and no rales. Abdominal: Soft. Bowel sounds are normal. No distension and no mass. No tenderness. No rebound and no guarding. Musculoskeletal: No edema and no tenderness. Skin: No rash or erythema.

## 2023-02-18 DIAGNOSIS — R39.14 BENIGN PROSTATIC HYPERPLASIA WITH INCOMPLETE BLADDER EMPTYING: ICD-10-CM

## 2023-02-18 DIAGNOSIS — I10 PRIMARY HYPERTENSION: ICD-10-CM

## 2023-02-18 DIAGNOSIS — N40.1 BENIGN PROSTATIC HYPERPLASIA WITH INCOMPLETE BLADDER EMPTYING: ICD-10-CM

## 2023-02-20 RX ORDER — DOXAZOSIN 8 MG/1
TABLET ORAL
Qty: 15 TABLET | OUTPATIENT
Start: 2023-02-20

## 2023-04-26 ENCOUNTER — OFFICE VISIT (OUTPATIENT)
Dept: INTERNAL MEDICINE CLINIC | Age: 73
End: 2023-04-26
Payer: MEDICARE

## 2023-04-26 VITALS
DIASTOLIC BLOOD PRESSURE: 64 MMHG | HEIGHT: 69 IN | SYSTOLIC BLOOD PRESSURE: 116 MMHG | WEIGHT: 191 LBS | BODY MASS INDEX: 28.29 KG/M2 | HEART RATE: 91 BPM | OXYGEN SATURATION: 99 %

## 2023-04-26 DIAGNOSIS — J30.9 ALLERGIC RHINITIS WITH POSTNASAL DRIP: Primary | ICD-10-CM

## 2023-04-26 DIAGNOSIS — R09.82 ALLERGIC RHINITIS WITH POSTNASAL DRIP: Primary | ICD-10-CM

## 2023-04-26 PROCEDURE — 3017F COLORECTAL CA SCREEN DOC REV: CPT | Performed by: INTERNAL MEDICINE

## 2023-04-26 PROCEDURE — G8417 CALC BMI ABV UP PARAM F/U: HCPCS | Performed by: INTERNAL MEDICINE

## 2023-04-26 PROCEDURE — G8427 DOCREV CUR MEDS BY ELIG CLIN: HCPCS | Performed by: INTERNAL MEDICINE

## 2023-04-26 PROCEDURE — 1036F TOBACCO NON-USER: CPT | Performed by: INTERNAL MEDICINE

## 2023-04-26 PROCEDURE — 99213 OFFICE O/P EST LOW 20 MIN: CPT | Performed by: INTERNAL MEDICINE

## 2023-04-26 PROCEDURE — 1123F ACP DISCUSS/DSCN MKR DOCD: CPT | Performed by: INTERNAL MEDICINE

## 2023-04-26 RX ORDER — FLUTICASONE PROPIONATE 50 MCG
2 SPRAY, SUSPENSION (ML) NASAL DAILY
Qty: 48 G | Refills: 0 | Status: SHIPPED | OUTPATIENT
Start: 2023-04-26

## 2023-04-26 RX ORDER — CETIRIZINE HYDROCHLORIDE 10 MG/1
10 TABLET ORAL DAILY
Qty: 30 TABLET | Refills: 0 | Status: SHIPPED | OUTPATIENT
Start: 2023-04-26 | End: 2023-05-26

## 2023-04-26 NOTE — PROGRESS NOTES
Marco Antonio Gould  YOB: 1950    Date of Service:  4/26/2023    Chief Complaint:      Chief Complaint   Patient presents with    Cough     Intermittent cough x 1 week     Emesis     Clear mucus x 1 week        Assessment/Plan:  See Sandhu was seen today for cough and emesis. Diagnoses and all orders for this visit:    Allergic rhinitis with postnasal drip  Start fluticasone (FLONASE) 50 MCG/ACT nasal spray; 2 sprays by Each Nostril route daily  Start cetirizine (ZYRTEC) 10 MG tablet; Take 1 tablet by mouth daily    Return if symptoms worsen or fail to improve. HPI:  Marco Antonio Gould is a 67 y.o. He complain of a tickle in his throat which leads to a cough with clear phlegm for about a week. He's been taking some Mucinex q8 hr.  He does have a runny nose that's clear.     Lab Results   Component Value Date    LABA1C 5.7 02/14/2021    LABMICR YES 02/13/2021     Lab Results   Component Value Date     01/09/2023    K 4.6 01/09/2023     01/09/2023    CO2 28 01/09/2023    BUN 13 01/09/2023    CREATININE 0.8 01/09/2023    GLUCOSE 94 01/09/2023    CALCIUM 9.5 01/09/2023     Lab Results   Component Value Date/Time    CHOL 238 01/09/2023 02:26 PM    TRIG 106 01/09/2023 02:26 PM    HDL 59 01/09/2023 02:26 PM    LDLCALC 158 01/09/2023 02:26 PM     Lab Results   Component Value Date    ALT 17 01/09/2023    AST 20 01/09/2023     Lab Results   Component Value Date    TSH 1.22 01/09/2023     Lab Results   Component Value Date    WBC 9.0 01/09/2023    HGB 13.9 01/09/2023    HCT 43.0 01/09/2023    MCV 89.5 01/09/2023     01/09/2023     No results found for: INR   Lab Results   Component Value Date    PSA 1.13 02/14/2021      No results found for: [facility]     Patient Active Problem List   Diagnosis    Hyperglycemia       No Known Allergies  Outpatient Medications Marked as Taking for the 4/26/23 encounter (Office Visit) with Magali Hunt MD   Medication Sig Dispense Refill    doxazosin (CARDURA)

## 2023-05-08 ENCOUNTER — OFFICE VISIT (OUTPATIENT)
Dept: INTERNAL MEDICINE CLINIC | Age: 73
End: 2023-05-08
Payer: MEDICARE

## 2023-05-08 VITALS
OXYGEN SATURATION: 93 % | HEART RATE: 92 BPM | WEIGHT: 191 LBS | SYSTOLIC BLOOD PRESSURE: 100 MMHG | DIASTOLIC BLOOD PRESSURE: 60 MMHG | HEIGHT: 69 IN | BODY MASS INDEX: 28.29 KG/M2

## 2023-05-08 DIAGNOSIS — R39.14 BENIGN PROSTATIC HYPERPLASIA WITH INCOMPLETE BLADDER EMPTYING: ICD-10-CM

## 2023-05-08 DIAGNOSIS — I10 PRIMARY HYPERTENSION: ICD-10-CM

## 2023-05-08 DIAGNOSIS — R09.82 ALLERGIC RHINITIS WITH POSTNASAL DRIP: ICD-10-CM

## 2023-05-08 DIAGNOSIS — N40.1 BENIGN PROSTATIC HYPERPLASIA WITH INCOMPLETE BLADDER EMPTYING: ICD-10-CM

## 2023-05-08 DIAGNOSIS — J98.01 COUGH DUE TO BRONCHOSPASM: Primary | ICD-10-CM

## 2023-05-08 DIAGNOSIS — J30.9 ALLERGIC RHINITIS WITH POSTNASAL DRIP: ICD-10-CM

## 2023-05-08 PROCEDURE — 3017F COLORECTAL CA SCREEN DOC REV: CPT | Performed by: INTERNAL MEDICINE

## 2023-05-08 PROCEDURE — 1036F TOBACCO NON-USER: CPT | Performed by: INTERNAL MEDICINE

## 2023-05-08 PROCEDURE — 99214 OFFICE O/P EST MOD 30 MIN: CPT | Performed by: INTERNAL MEDICINE

## 2023-05-08 PROCEDURE — 3074F SYST BP LT 130 MM HG: CPT | Performed by: INTERNAL MEDICINE

## 2023-05-08 PROCEDURE — 3078F DIAST BP <80 MM HG: CPT | Performed by: INTERNAL MEDICINE

## 2023-05-08 PROCEDURE — G8417 CALC BMI ABV UP PARAM F/U: HCPCS | Performed by: INTERNAL MEDICINE

## 2023-05-08 PROCEDURE — 1123F ACP DISCUSS/DSCN MKR DOCD: CPT | Performed by: INTERNAL MEDICINE

## 2023-05-08 PROCEDURE — G8427 DOCREV CUR MEDS BY ELIG CLIN: HCPCS | Performed by: INTERNAL MEDICINE

## 2023-05-08 RX ORDER — ALBUTEROL SULFATE 90 UG/1
2 AEROSOL, METERED RESPIRATORY (INHALATION) 4 TIMES DAILY PRN
Qty: 18 G | Refills: 0 | Status: SHIPPED | OUTPATIENT
Start: 2023-05-08

## 2023-05-08 RX ORDER — MONTELUKAST SODIUM 10 MG/1
10 TABLET ORAL DAILY
Qty: 30 TABLET | Refills: 0 | Status: SHIPPED | OUTPATIENT
Start: 2023-05-08

## 2023-05-08 NOTE — PROGRESS NOTES
02:26 PM     Lab Results   Component Value Date    ALT 17 01/09/2023    AST 20 01/09/2023     Lab Results   Component Value Date    TSH 1.22 01/09/2023     Lab Results   Component Value Date    WBC 9.0 01/09/2023    HGB 13.9 01/09/2023    HCT 43.0 01/09/2023    MCV 89.5 01/09/2023     01/09/2023     No results found for: INR   Lab Results   Component Value Date    PSA 1.13 02/14/2021      No results found for: [facility]     Patient Active Problem List   Diagnosis    Hyperglycemia       No Known Allergies  Outpatient Medications Marked as Taking for the 5/8/23 encounter (Office Visit) with Nataliia Vela MD   Medication Sig Dispense Refill    fluticasone (FLONASE) 50 MCG/ACT nasal spray 2 sprays by Each Nostril route daily 48 g 0    cetirizine (ZYRTEC) 10 MG tablet Take 1 tablet by mouth daily 30 tablet 0    doxazosin (CARDURA) 4 MG tablet Take 1 tablet by mouth daily 90 tablet 1         Review of Systems: 14 systems were negative except of what was stated on HPI    Nursing note and vitals reviewed. Vitals:    05/08/23 1348   BP: (!) 110/58   Site: Left Upper Arm   Position: Sitting   Cuff Size: Large Adult   Pulse: 92   SpO2: 93%   Weight: 191 lb (86.6 kg)   Height: 5' 9\" (1.753 m)     Wt Readings from Last 3 Encounters:   05/08/23 191 lb (86.6 kg)   04/26/23 191 lb (86.6 kg)   02/06/23 201 lb (91.2 kg)     BP Readings from Last 3 Encounters:   05/08/23 (!) 110/58   04/26/23 116/64   02/06/23 126/70     Body mass index is 28.21 kg/m². Constitutional: Patient appears well-developed and well-nourished. No distress. Head: Normocephalic and atraumatic. Neck: Normal range of motion. Neck supple. No thyroidmegaly. Cardiovascular: Normal rate, regular rhythm, normal heart sounds and intact distal pulses. Pulmonary/Chest: Effort normal and breath sounds normal. No stridor. No respiratory distress. No wheezes and no rales. Abdominal: Soft. Bowel sounds are normal. No distension and no mass. No tenderness.

## 2023-05-11 ENCOUNTER — TELEMEDICINE (OUTPATIENT)
Dept: PRIMARY CARE CLINIC | Age: 73
End: 2023-05-11
Payer: MEDICARE

## 2023-05-11 ENCOUNTER — TELEPHONE (OUTPATIENT)
Dept: INTERNAL MEDICINE CLINIC | Age: 73
End: 2023-05-11

## 2023-05-11 DIAGNOSIS — J39.2 IRRITATED THROAT: ICD-10-CM

## 2023-05-11 DIAGNOSIS — R05.1 ACUTE COUGH: Primary | ICD-10-CM

## 2023-05-11 PROCEDURE — 99213 OFFICE O/P EST LOW 20 MIN: CPT | Performed by: NURSE PRACTITIONER

## 2023-05-11 PROCEDURE — G8427 DOCREV CUR MEDS BY ELIG CLIN: HCPCS | Performed by: NURSE PRACTITIONER

## 2023-05-11 PROCEDURE — 3017F COLORECTAL CA SCREEN DOC REV: CPT | Performed by: NURSE PRACTITIONER

## 2023-05-11 PROCEDURE — 1123F ACP DISCUSS/DSCN MKR DOCD: CPT | Performed by: NURSE PRACTITIONER

## 2023-05-11 RX ORDER — DEXTROMETHORPHAN HYDROBROMIDE AND PROMETHAZINE HYDROCHLORIDE 15; 6.25 MG/5ML; MG/5ML
5 SYRUP ORAL 4 TIMES DAILY PRN
Qty: 120 ML | Refills: 0 | Status: SHIPPED | OUTPATIENT
Start: 2023-05-11

## 2023-05-11 RX ORDER — BENZONATATE 200 MG/1
200 CAPSULE ORAL 3 TIMES DAILY PRN
Qty: 30 CAPSULE | Refills: 0 | Status: SHIPPED | OUTPATIENT
Start: 2023-05-11 | End: 2023-05-21

## 2023-05-11 ASSESSMENT — ENCOUNTER SYMPTOMS
SORE THROAT: 1
COUGH: 1

## 2023-05-11 NOTE — TELEPHONE ENCOUNTER
Patient's wife called in to office wanting to schedule an appointment for  regarding ongoing cough. She states that that medications that were prescribed are not helping and he is now coughing up yellow phlegm. I advised her that Dr. Shanita Hernandez is out of the office this week but I could schedule him an appointment with a virtualist provider or he could go to urgent care for treatment. They wish to go ahead with a virtual provider appointment. Appointment scheduled.

## 2023-05-11 NOTE — PROGRESS NOTES
Rabia Soto (:  1950) is a Established patient, presenting virtually for evaluation of the following:  Productive cough with yellow sputum and throat irritation for 1 week now History of asthma    Assessment & Plan   Below is the assessment and plan developed based on review of pertinent history, physical exam, labs, studies, and medications. 1. Acute cough  Problem  -     promethazine-dextromethorphan (PROMETHAZINE-DM) 6.25-15 MG/5ML syrup; Take 5 mLs by mouth 4 times daily as needed for Cough, Disp-120 mL, R-0Normal  -     benzonatate (TESSALON) 200 MG capsule; Take 1 capsule by mouth 3 times daily as needed for Cough, Disp-30 capsule, [id number] was instructed to not take these 2 medications together to alternate approximately 4 to 5 hours apart from each other. He verbalized understanding of this  Continue rescue inhaler as previously directed by PCP  See patient instructions    2. Irritated throat  Problem  Continue taking throat lozenges as the over-the-counter directions state    May MyChart me next week if symptoms have not improved or if they have worsened and we will consider an antibiotic due to the history of asthma    Follow-up with PCP if symptoms do not improve or if they worsen          Subjective   This is a 79-year-old male patient of Dr. Merlin Favia consenting to a virtual visit  He has complaints of a productive cough with yellow sputum and throat irritation due to the coughing. His symptoms started a week ago and just cannot get any better he states. He does have a history of asthma. He has been using his inhaler, nasal spray and throat lozenges with little effect. Review of Systems   HENT:  Positive for sore throat (Throat irritation). Respiratory:  Positive for cough. All other systems reviewed and are negative.       Objective   Patient-Reported Vitals  Patient-Reported Temperature: Denies  Patient-Reported Weight: 190 pounds       Physical Exam  [INSTRUCTIONS:  \"[x]\"

## 2023-05-17 ENCOUNTER — TELEPHONE (OUTPATIENT)
Dept: INTERNAL MEDICINE CLINIC | Age: 73
End: 2023-05-17

## 2023-05-17 NOTE — TELEPHONE ENCOUNTER
Spoke with patient himself, he reluctantly agreed to go to an Urgent Care. He states he may wait until tomorrow am, but he will go to be checked out.

## 2023-05-17 NOTE — TELEPHONE ENCOUNTER
Patient's wife Lissa Phelan trying to send MON[place]J message to Outlistenist Hailee Wright. Wife states patient's cough is getting worse, and he is now running a fever. Advised how to send a message, and that I will also try to send as well.  is out of office until 5-22-23. Gave patient 3 Urgent Care options w/Phone#'s and addresses if needed. Advised not to wait until 's return in 5 days.

## 2023-06-26 ENCOUNTER — TELEPHONE (OUTPATIENT)
Dept: INTERNAL MEDICINE CLINIC | Age: 73
End: 2023-06-26

## 2023-06-28 ENCOUNTER — HOSPITAL ENCOUNTER (OUTPATIENT)
Dept: GENERAL RADIOLOGY | Age: 73
Discharge: HOME | End: 2023-06-28
Payer: MEDICARE

## 2023-06-28 ENCOUNTER — OFFICE VISIT (OUTPATIENT)
Dept: INTERNAL MEDICINE CLINIC | Age: 73
End: 2023-06-28

## 2023-06-28 ENCOUNTER — HOSPITAL ENCOUNTER (OUTPATIENT)
Dept: CT IMAGING | Age: 73
Discharge: HOME | End: 2023-06-28
Attending: INTERNAL MEDICINE
Payer: MEDICARE

## 2023-06-28 ENCOUNTER — HOSPITAL ENCOUNTER (OUTPATIENT)
Age: 73
Discharge: HOME | End: 2023-06-28
Payer: MEDICARE

## 2023-06-28 ENCOUNTER — TELEPHONE (OUTPATIENT)
Dept: PULMONOLOGY | Age: 73
End: 2023-06-28

## 2023-06-28 VITALS
HEART RATE: 51 BPM | HEIGHT: 69 IN | DIASTOLIC BLOOD PRESSURE: 64 MMHG | OXYGEN SATURATION: 91 % | SYSTOLIC BLOOD PRESSURE: 124 MMHG | WEIGHT: 177 LBS | BODY MASS INDEX: 26.22 KG/M2

## 2023-06-28 DIAGNOSIS — R91.8 MASS OF LUNG: ICD-10-CM

## 2023-06-28 DIAGNOSIS — Z87.891 PERSONAL HISTORY OF TOBACCO USE: ICD-10-CM

## 2023-06-28 DIAGNOSIS — N40.1 BENIGN PROSTATIC HYPERPLASIA WITH INCOMPLETE BLADDER EMPTYING: ICD-10-CM

## 2023-06-28 DIAGNOSIS — R05.3 CHRONIC COUGH: ICD-10-CM

## 2023-06-28 DIAGNOSIS — R39.14 BENIGN PROSTATIC HYPERPLASIA WITH INCOMPLETE BLADDER EMPTYING: ICD-10-CM

## 2023-06-28 DIAGNOSIS — R91.8 MASS OF LUNG: Primary | ICD-10-CM

## 2023-06-28 DIAGNOSIS — R13.19 OTHER DYSPHAGIA: ICD-10-CM

## 2023-06-28 LAB
PERFORMED ON: NORMAL
POC CREATININE: 1 MG/DL (ref 0.8–1.3)
POC SAMPLE TYPE: NORMAL

## 2023-06-28 PROCEDURE — 6360000004 HC RX CONTRAST MEDICATION: Performed by: INTERNAL MEDICINE

## 2023-06-28 PROCEDURE — 71260 CT THORAX DX C+: CPT

## 2023-06-28 PROCEDURE — 71046 X-RAY EXAM CHEST 2 VIEWS: CPT

## 2023-06-28 PROCEDURE — 82565 ASSAY OF CREATININE: CPT

## 2023-06-28 RX ORDER — FLUTICASONE FUROATE, UMECLIDINIUM BROMIDE AND VILANTEROL TRIFENATATE 200; 62.5; 25 UG/1; UG/1; UG/1
1 POWDER RESPIRATORY (INHALATION) DAILY
Qty: 1 EACH | Refills: 0 | Status: SHIPPED | OUTPATIENT
Start: 2023-06-28

## 2023-06-28 RX ORDER — TAMSULOSIN HYDROCHLORIDE 0.4 MG/1
0.4 CAPSULE ORAL DAILY
Qty: 90 CAPSULE | Refills: 0 | Status: SHIPPED | OUTPATIENT
Start: 2023-06-28

## 2023-06-28 RX ORDER — ALBUTEROL SULFATE 90 UG/1
2 AEROSOL, METERED RESPIRATORY (INHALATION) 4 TIMES DAILY PRN
Qty: 18 G | Refills: 0 | Status: SHIPPED | OUTPATIENT
Start: 2023-06-28

## 2023-06-28 RX ADMIN — IOPAMIDOL 75 ML: 755 INJECTION, SOLUTION INTRAVENOUS at 17:43

## 2023-06-29 ENCOUNTER — OFFICE VISIT (OUTPATIENT)
Dept: PULMONOLOGY | Age: 73
End: 2023-06-29
Payer: MEDICARE

## 2023-06-29 VITALS
DIASTOLIC BLOOD PRESSURE: 69 MMHG | HEIGHT: 69 IN | BODY MASS INDEX: 26.6 KG/M2 | OXYGEN SATURATION: 99 % | RESPIRATION RATE: 16 BRPM | TEMPERATURE: 98.4 F | WEIGHT: 179.6 LBS | HEART RATE: 101 BPM | SYSTOLIC BLOOD PRESSURE: 123 MMHG

## 2023-06-29 DIAGNOSIS — R59.0 MEDIASTINAL ADENOPATHY: ICD-10-CM

## 2023-06-29 DIAGNOSIS — M79.89 LEFT LEG SWELLING: ICD-10-CM

## 2023-06-29 DIAGNOSIS — R93.3 ABNORMAL CT SCAN, ESOPHAGUS: ICD-10-CM

## 2023-06-29 DIAGNOSIS — R59.0 PARATRACHEAL LYMPHADENOPATHY: ICD-10-CM

## 2023-06-29 DIAGNOSIS — R91.8 LUNG MASS: Primary | ICD-10-CM

## 2023-06-29 PROCEDURE — G8427 DOCREV CUR MEDS BY ELIG CLIN: HCPCS | Performed by: STUDENT IN AN ORGANIZED HEALTH CARE EDUCATION/TRAINING PROGRAM

## 2023-06-29 PROCEDURE — 1123F ACP DISCUSS/DSCN MKR DOCD: CPT | Performed by: STUDENT IN AN ORGANIZED HEALTH CARE EDUCATION/TRAINING PROGRAM

## 2023-06-29 PROCEDURE — G8417 CALC BMI ABV UP PARAM F/U: HCPCS | Performed by: STUDENT IN AN ORGANIZED HEALTH CARE EDUCATION/TRAINING PROGRAM

## 2023-06-29 PROCEDURE — 3017F COLORECTAL CA SCREEN DOC REV: CPT | Performed by: STUDENT IN AN ORGANIZED HEALTH CARE EDUCATION/TRAINING PROGRAM

## 2023-06-29 PROCEDURE — 1036F TOBACCO NON-USER: CPT | Performed by: STUDENT IN AN ORGANIZED HEALTH CARE EDUCATION/TRAINING PROGRAM

## 2023-06-29 PROCEDURE — 99204 OFFICE O/P NEW MOD 45 MIN: CPT | Performed by: STUDENT IN AN ORGANIZED HEALTH CARE EDUCATION/TRAINING PROGRAM

## 2023-06-29 ASSESSMENT — ENCOUNTER SYMPTOMS
ABDOMINAL PAIN: 0
STRIDOR: 0
EYE DISCHARGE: 0
VOMITING: 1
COLOR CHANGE: 0
SHORTNESS OF BREATH: 1
SORE THROAT: 0
CONSTIPATION: 0
EYE PAIN: 0
EYE ITCHING: 0
BACK PAIN: 0
DIARRHEA: 0
NAUSEA: 0
TROUBLE SWALLOWING: 0
EYE REDNESS: 0
ABDOMINAL DISTENTION: 0
WHEEZING: 0
COUGH: 1

## 2023-07-10 ENCOUNTER — HOSPITAL ENCOUNTER (OUTPATIENT)
Dept: VASCULAR LAB | Age: 73
Discharge: HOME | End: 2023-07-10
Attending: STUDENT IN AN ORGANIZED HEALTH CARE EDUCATION/TRAINING PROGRAM
Payer: MEDICARE

## 2023-07-10 ENCOUNTER — ANESTHESIA EVENT (OUTPATIENT)
Dept: ENDOSCOPY | Age: 73
End: 2023-07-10
Payer: MEDICARE

## 2023-07-10 DIAGNOSIS — M79.89 LEFT LEG SWELLING: ICD-10-CM

## 2023-07-10 PROCEDURE — 93970 EXTREMITY STUDY: CPT

## 2023-07-11 ENCOUNTER — ANESTHESIA (OUTPATIENT)
Dept: ENDOSCOPY | Age: 73
End: 2023-07-11
Payer: MEDICARE

## 2023-07-11 ENCOUNTER — HOSPITAL ENCOUNTER (OUTPATIENT)
Age: 73
Setting detail: OUTPATIENT SURGERY
Discharge: HOME | End: 2023-07-11
Attending: STUDENT IN AN ORGANIZED HEALTH CARE EDUCATION/TRAINING PROGRAM | Admitting: STUDENT IN AN ORGANIZED HEALTH CARE EDUCATION/TRAINING PROGRAM
Payer: MEDICARE

## 2023-07-11 VITALS
SYSTOLIC BLOOD PRESSURE: 107 MMHG | BODY MASS INDEX: 26.51 KG/M2 | TEMPERATURE: 98.7 F | HEART RATE: 102 BPM | DIASTOLIC BLOOD PRESSURE: 75 MMHG | HEIGHT: 69 IN | OXYGEN SATURATION: 92 % | RESPIRATION RATE: 17 BRPM | WEIGHT: 179 LBS

## 2023-07-11 LAB
INR PPP: 1.21 (ref 0.84–1.16)
PLATELET # BLD AUTO: 346 K/UL (ref 135–450)
PROTHROMBIN TIME: 15.3 SEC (ref 11.5–14.8)

## 2023-07-11 PROCEDURE — 3603165200 HC BRNCHSC EBUS GUIDED SAMPL 1/2 NODE STATION/STRUX: Performed by: STUDENT IN AN ORGANIZED HEALTH CARE EDUCATION/TRAINING PROGRAM

## 2023-07-11 PROCEDURE — 2720000010 HC SURG SUPPLY STERILE: Performed by: STUDENT IN AN ORGANIZED HEALTH CARE EDUCATION/TRAINING PROGRAM

## 2023-07-11 PROCEDURE — 88177 CYTP FNA EVAL EA ADDL: CPT

## 2023-07-11 PROCEDURE — 6370000000 HC RX 637 (ALT 250 FOR IP): Performed by: STUDENT IN AN ORGANIZED HEALTH CARE EDUCATION/TRAINING PROGRAM

## 2023-07-11 PROCEDURE — 31653 BRONCH EBUS SAMPLNG 3/> NODE: CPT | Performed by: STUDENT IN AN ORGANIZED HEALTH CARE EDUCATION/TRAINING PROGRAM

## 2023-07-11 PROCEDURE — 2580000003 HC RX 258: Performed by: ANESTHESIOLOGY

## 2023-07-11 PROCEDURE — 6370000000 HC RX 637 (ALT 250 FOR IP): Performed by: NURSE ANESTHETIST, CERTIFIED REGISTERED

## 2023-07-11 PROCEDURE — 2500000003 HC RX 250 WO HCPCS: Performed by: NURSE ANESTHETIST, CERTIFIED REGISTERED

## 2023-07-11 PROCEDURE — 7100000010 HC PHASE II RECOVERY - FIRST 15 MIN: Performed by: STUDENT IN AN ORGANIZED HEALTH CARE EDUCATION/TRAINING PROGRAM

## 2023-07-11 PROCEDURE — 85049 AUTOMATED PLATELET COUNT: CPT

## 2023-07-11 PROCEDURE — 85610 PROTHROMBIN TIME: CPT

## 2023-07-11 PROCEDURE — 2709999900 HC NON-CHARGEABLE SUPPLY: Performed by: STUDENT IN AN ORGANIZED HEALTH CARE EDUCATION/TRAINING PROGRAM

## 2023-07-11 PROCEDURE — 31645 BRNCHSC W/THER ASPIR 1ST: CPT | Performed by: STUDENT IN AN ORGANIZED HEALTH CARE EDUCATION/TRAINING PROGRAM

## 2023-07-11 PROCEDURE — 3609011700 HC BRONCHOSCOPY/TRANSBRONCHIAL LUNG BIOPSY ADDL LOBE: Performed by: STUDENT IN AN ORGANIZED HEALTH CARE EDUCATION/TRAINING PROGRAM

## 2023-07-11 PROCEDURE — 6370000000 HC RX 637 (ALT 250 FOR IP)

## 2023-07-11 PROCEDURE — 3609011800 HC BRONCHOSCOPY/TRANSBRONCHIAL LUNG BIOPSY: Performed by: STUDENT IN AN ORGANIZED HEALTH CARE EDUCATION/TRAINING PROGRAM

## 2023-07-11 PROCEDURE — 3700000000 HC ANESTHESIA ATTENDED CARE: Performed by: STUDENT IN AN ORGANIZED HEALTH CARE EDUCATION/TRAINING PROGRAM

## 2023-07-11 PROCEDURE — 88305 TISSUE EXAM BY PATHOLOGIST: CPT

## 2023-07-11 PROCEDURE — 3700000001 HC ADD 15 MINUTES (ANESTHESIA): Performed by: STUDENT IN AN ORGANIZED HEALTH CARE EDUCATION/TRAINING PROGRAM

## 2023-07-11 PROCEDURE — 88342 IMHCHEM/IMCYTCHM 1ST ANTB: CPT

## 2023-07-11 PROCEDURE — 7100000011 HC PHASE II RECOVERY - ADDTL 15 MIN: Performed by: STUDENT IN AN ORGANIZED HEALTH CARE EDUCATION/TRAINING PROGRAM

## 2023-07-11 PROCEDURE — 88172 CYTP DX EVAL FNA 1ST EA SITE: CPT

## 2023-07-11 PROCEDURE — 88341 IMHCHEM/IMCYTCHM EA ADD ANTB: CPT

## 2023-07-11 PROCEDURE — 88173 CYTOPATH EVAL FNA REPORT: CPT

## 2023-07-11 PROCEDURE — 6360000002 HC RX W HCPCS: Performed by: NURSE ANESTHETIST, CERTIFIED REGISTERED

## 2023-07-11 RX ORDER — IPRATROPIUM BROMIDE AND ALBUTEROL SULFATE 2.5; .5 MG/3ML; MG/3ML
1 SOLUTION RESPIRATORY (INHALATION) ONCE
Status: COMPLETED | OUTPATIENT
Start: 2023-07-11 | End: 2023-07-11

## 2023-07-11 RX ORDER — SODIUM CHLORIDE 0.9 % (FLUSH) 0.9 %
5-40 SYRINGE (ML) INJECTION PRN
Status: DISCONTINUED | OUTPATIENT
Start: 2023-07-11 | End: 2023-07-11 | Stop reason: HOSPADM

## 2023-07-11 RX ORDER — SODIUM CHLORIDE, SODIUM LACTATE, POTASSIUM CHLORIDE, CALCIUM CHLORIDE 600; 310; 30; 20 MG/100ML; MG/100ML; MG/100ML; MG/100ML
INJECTION, SOLUTION INTRAVENOUS CONTINUOUS
Status: DISCONTINUED | OUTPATIENT
Start: 2023-07-11 | End: 2023-07-11 | Stop reason: HOSPADM

## 2023-07-11 RX ORDER — DEXAMETHASONE SODIUM PHOSPHATE 10 MG/ML
INJECTION INTRAMUSCULAR; INTRAVENOUS PRN
Status: DISCONTINUED | OUTPATIENT
Start: 2023-07-11 | End: 2023-07-11 | Stop reason: SDUPTHER

## 2023-07-11 RX ORDER — IPRATROPIUM BROMIDE AND ALBUTEROL SULFATE 2.5; .5 MG/3ML; MG/3ML
SOLUTION RESPIRATORY (INHALATION)
Status: COMPLETED
Start: 2023-07-11 | End: 2023-07-11

## 2023-07-11 RX ORDER — SODIUM CHLORIDE 9 MG/ML
INJECTION, SOLUTION INTRAVENOUS PRN
Status: DISCONTINUED | OUTPATIENT
Start: 2023-07-11 | End: 2023-07-11 | Stop reason: HOSPADM

## 2023-07-11 RX ORDER — LIDOCAINE HYDROCHLORIDE 10 MG/ML
1 INJECTION, SOLUTION EPIDURAL; INFILTRATION; INTRACAUDAL; PERINEURAL
Status: DISCONTINUED | OUTPATIENT
Start: 2023-07-11 | End: 2023-07-11 | Stop reason: HOSPADM

## 2023-07-11 RX ORDER — OXYCODONE HYDROCHLORIDE 5 MG/1
5 TABLET ORAL PRN
Status: DISCONTINUED | OUTPATIENT
Start: 2023-07-11 | End: 2023-07-11 | Stop reason: HOSPADM

## 2023-07-11 RX ORDER — ONDANSETRON 2 MG/ML
4 INJECTION INTRAMUSCULAR; INTRAVENOUS EVERY 30 MIN PRN
Status: DISCONTINUED | OUTPATIENT
Start: 2023-07-11 | End: 2023-07-11 | Stop reason: HOSPADM

## 2023-07-11 RX ORDER — LIDOCAINE HYDROCHLORIDE 40 MG/ML
SOLUTION TOPICAL PRN
Status: DISCONTINUED | OUTPATIENT
Start: 2023-07-11 | End: 2023-07-11 | Stop reason: SDUPTHER

## 2023-07-11 RX ORDER — LIDOCAINE HYDROCHLORIDE 20 MG/ML
INJECTION, SOLUTION INFILTRATION; PERINEURAL PRN
Status: DISCONTINUED | OUTPATIENT
Start: 2023-07-11 | End: 2023-07-11 | Stop reason: SDUPTHER

## 2023-07-11 RX ORDER — SODIUM CHLORIDE 0.9 % (FLUSH) 0.9 %
5-40 SYRINGE (ML) INJECTION EVERY 12 HOURS SCHEDULED
Status: DISCONTINUED | OUTPATIENT
Start: 2023-07-11 | End: 2023-07-11 | Stop reason: HOSPADM

## 2023-07-11 RX ORDER — ONDANSETRON 2 MG/ML
INJECTION INTRAMUSCULAR; INTRAVENOUS PRN
Status: DISCONTINUED | OUTPATIENT
Start: 2023-07-11 | End: 2023-07-11 | Stop reason: SDUPTHER

## 2023-07-11 RX ORDER — MIDAZOLAM HYDROCHLORIDE 1 MG/ML
2 INJECTION INTRAMUSCULAR; INTRAVENOUS
Status: DISCONTINUED | OUTPATIENT
Start: 2023-07-11 | End: 2023-07-11 | Stop reason: HOSPADM

## 2023-07-11 RX ORDER — OXYCODONE HYDROCHLORIDE 5 MG/1
10 TABLET ORAL PRN
Status: DISCONTINUED | OUTPATIENT
Start: 2023-07-11 | End: 2023-07-11 | Stop reason: HOSPADM

## 2023-07-11 RX ORDER — PROPOFOL 10 MG/ML
INJECTION, EMULSION INTRAVENOUS PRN
Status: DISCONTINUED | OUTPATIENT
Start: 2023-07-11 | End: 2023-07-11 | Stop reason: SDUPTHER

## 2023-07-11 RX ORDER — ROCURONIUM BROMIDE 10 MG/ML
INJECTION, SOLUTION INTRAVENOUS PRN
Status: DISCONTINUED | OUTPATIENT
Start: 2023-07-11 | End: 2023-07-11 | Stop reason: SDUPTHER

## 2023-07-11 RX ADMIN — ROCURONIUM BROMIDE 50 MG: 10 SOLUTION INTRAVENOUS at 09:29

## 2023-07-11 RX ADMIN — LIDOCAINE HYDROCHLORIDE 4 ML: 40 SOLUTION TOPICAL at 09:31

## 2023-07-11 RX ADMIN — LIDOCAINE HYDROCHLORIDE 100 MG: 20 INJECTION, SOLUTION INFILTRATION; PERINEURAL at 09:29

## 2023-07-11 RX ADMIN — ONDANSETRON 4 MG: 2 INJECTION INTRAMUSCULAR; INTRAVENOUS at 09:38

## 2023-07-11 RX ADMIN — IPRATROPIUM BROMIDE AND ALBUTEROL SULFATE 1 DOSE: .5; 3 SOLUTION RESPIRATORY (INHALATION) at 08:31

## 2023-07-11 RX ADMIN — IPRATROPIUM BROMIDE AND ALBUTEROL SULFATE 1 DOSE: 2.5; .5 SOLUTION RESPIRATORY (INHALATION) at 11:04

## 2023-07-11 RX ADMIN — SODIUM CHLORIDE, POTASSIUM CHLORIDE, SODIUM LACTATE AND CALCIUM CHLORIDE: 600; 310; 30; 20 INJECTION, SOLUTION INTRAVENOUS at 08:34

## 2023-07-11 RX ADMIN — IPRATROPIUM BROMIDE AND ALBUTEROL SULFATE 1 DOSE: 2.5; .5 SOLUTION RESPIRATORY (INHALATION) at 08:31

## 2023-07-11 RX ADMIN — PROPOFOL 150 MG: 10 INJECTION, EMULSION INTRAVENOUS at 09:29

## 2023-07-11 RX ADMIN — DEXAMETHASONE SODIUM PHOSPHATE 10 MG: 10 INJECTION INTRAMUSCULAR; INTRAVENOUS at 09:38

## 2023-07-11 ASSESSMENT — ENCOUNTER SYMPTOMS
VOMITING: 0
DIARRHEA: 0
CONSTIPATION: 0
COLOR CHANGE: 0
BACK PAIN: 0
ABDOMINAL PAIN: 0
SORE THROAT: 0
EYE ITCHING: 0
EYE DISCHARGE: 0
EYE REDNESS: 0
EYE PAIN: 0
WHEEZING: 0
TROUBLE SWALLOWING: 0
ABDOMINAL DISTENTION: 0
COUGH: 1
SHORTNESS OF BREATH: 1
STRIDOR: 0
NAUSEA: 0

## 2023-07-11 ASSESSMENT — PAIN SCALES - GENERAL
PAINLEVEL_OUTOF10: 0

## 2023-07-11 ASSESSMENT — PAIN - FUNCTIONAL ASSESSMENT
PAIN_FUNCTIONAL_ASSESSMENT: 0-10
PAIN_FUNCTIONAL_ASSESSMENT: 0-10

## 2023-07-11 ASSESSMENT — COPD QUESTIONNAIRES: CAT_SEVERITY: SEVERE

## 2023-07-11 NOTE — DISCHARGE INSTRUCTIONS
ANESTHESIA DISCHARGE INSTRUCTIONS    You are under the influence of drugs- do not drink alcohol, drive a car, operate machinery(such as power tools, kitchen appliances, etc), sign legal documents, or make any important decisions for 24 hours (or while on pain medications). Children should not ride bikes or [place] or play on gym sets  for 24 hours after surgery. A responsible adult should be with you for 24 hours. Rest at home today- increase activity as tolerated. Progress slowly to a regular diet unless your physician has instructed you otherwise. Drink plenty of water. CALL YOUR DOCTOR IF YOU:  Have moderate to severe nausea or vomiting AND are unable to hold down fluids or prescribed medications. Have bright red bloody drainage from your dressing that won't stop oozing. Do not get relief with your pain medication    NORMAL (POSSIBLE) SIDE EFFECTS FROM ANESTHESIA:     Confusion, temporary memory loss, delayed reaction times in the first 24 hours  Lightheadedness, dizziness, difficulty focusing, blurred vision  Nausea/vomiting can happen  Shivering, feeling cold, sore throat, cough and muscle aches should stop within 24-48 hours  Trouble urinating - call your surgeon if it has been more than 8 hrs  Bruising or soreness at the IV site - call if it remains red, firm or there is drainage             FEMALES OF CHILDBEARING AGE WHO ARE TAKING BIRTH CONTROL PILLS:  You may have received a medication during your procedure that interferes with the   actions of birth control pills (Bridion or Emend). Use some other kind of birth control in addition to your pills, like a condom, for 1 month after your procedure to prevent unwanted pregnancy. The following instructions are to be followed if you have a known history or diagnosis of sleep apnea: For all sleep apnea patients:  ? Sleep on your side or sitting up in a chair whenever possible, especially the first 24 hours after surgery. ?  Use only medicines

## 2023-07-11 NOTE — ANESTHESIA POSTPROCEDURE EVALUATION
Department of Anesthesiology  Postprocedure Note    Patient: Augustus Post  MRN: [id number]  YOB: 1950  Date of evaluation: 7/11/2023      Procedure Summary     Date: 07/11/23 Room / Location: [facility] 03 / [address] [place] [address]    Anesthesia Start: 3980 Anesthesia Stop: 1034    Procedures:       ENDOBRONCHIAL ULTRASOUND WITH PATHOLOGY      BRONCHOSCOPY/TRANSBRONCHIAL LUNG BIOPSY      BRONCHOSCOPY/TRANSBRONCHIAL LUNG BIOPSY ADDL LOBE Diagnosis:       Lung mass      (Lung mass [R91.8])    Surgeons: Dhiraj Velazquez MD Responsible Provider: Minerva Castillo MD    Anesthesia Type: general ASA Status: 3          Anesthesia Type: No value filed.     Malik Phase I: Malik Score: 10    Malik Phase II: Malik Score: 10      Anesthesia Post Evaluation    Patient location during evaluation: PACU  Level of consciousness: awake and alert  Airway patency: patent  Nausea & Vomiting: no vomiting  Complications: no  Cardiovascular status: blood pressure returned to baseline  Respiratory status: acceptable  Hydration status: stable

## 2023-07-11 NOTE — DISCHARGE SUMMARY
Brief Discharge Summary    Patient is a 69-year-old male with significant past medical history of former smoker that presents to [facility] for EBUS with bronchoscopy. Patient had a EBUS performed today with biopsies at station 7, 4R, and 10 R. Patient tolerated the procedure well with no immediate complications. He was notified to return to the ER immediately if he has any large amount of hemoptysis, worsening chest pains, or severe shortness of breath. Patient understood. He will receive his results from his biopsies and 2-3 business days. I will call the patient for final results of biopsies. Patient is stable for discharge.     Ariadna Mendez MD  [facility] Pulmonary Critical Care

## 2023-07-11 NOTE — PROGRESS NOTES
Labs obtained with IV start and sent to lab. Pre and post operative expectations and routines explained to patient, verbalized understanding.

## 2023-07-11 NOTE — OP NOTE
Operative Note      Patient: Salvador Benedict  YOB: 1950  MRN: [id number]    Date of Procedure: 7/11/2023    Pre-Op Diagnosis Codes:     * Lung mass [R91.8]    Post-Op Diagnosis: Same       Procedure(s):  ENDOBRONCHIAL ULTRASOUND WITH PATHOLOGY  BRONCHOSCOPY/TRANSBRONCHIAL LUNG BIOPSY  BRONCHOSCOPY/TRANSBRONCHIAL LUNG BIOPSY ADDL LOBE    Surgeon(s):  Daniel Diggs MD    Assistant:   * No surgical staff found *    Anesthesia: General    Estimated Blood Loss (mL): Minimal    Complications: None    Specimens:   - Station 7 - 6 passes  - Station [employer] - 3 passes  - Station [employer] - 1 pass    Implants:  * No implants in log *      Drains: * No LDAs found *    Findings:   -No endobronchial lesions  -Paratracheal lymphadenopathy  -Hilar lymphadenopathy  -Mediastinal lymphadenopathy  -Mucous plugs    DESCRIPTION OF PROCEDURE: Informed consent was obtained from the patient after explaining the risks and benefits. A time out was taken. General or total intravenous anesthesia was kindly provided by the anesthetist.     The standard bronchoscope was passed through the endotracheal tube. The entire tracheobronchial tree was inspected. There were no endobronchial lesions throughout the entire tracheobronchial tree. The anatomy and the mucosa of the tracheobronchial tree was normal.  Mucous plugs were suctioned through the right mainstem, left mainstem, right lower lobe. There were no mucous plugs seen for the rest of the bronchial tree. Standard bronchoscope was then removed. The scope was passed with ease via the ETT. Direct visualization of the lymph nodes was obtained using endobronchial ultrasound (EBUS) guidance. A complete ultrasound lymph node exam was performed for lymph node stations 4, 7, 10 and 11. The following lymph nodes were subjected to EBUS guided biopsy using standard technique and in the following order:    1. Station 7 - 6 passes   2. Station 4R - 3 passes  3.   Station 10R - 1

## 2023-07-11 NOTE — PROGRESS NOTES
Pt arrives in PACU resting quietly. Resp easy and regular. VS stable. Pt coughing intermittently. Blood tinged clear sputum noted. LR infusing at [facility] rate. 950cc LTC. Report from [facility] from Endo.

## 2023-07-12 DIAGNOSIS — C34.90 SQUAMOUS CELL CARCINOMA OF LUNG, UNSPECIFIED LATERALITY (HCC): Primary | ICD-10-CM

## 2023-07-13 ENCOUNTER — ANESTHESIA (OUTPATIENT)
Dept: ENDOSCOPY | Age: 73
End: 2023-07-13
Payer: MEDICARE

## 2023-07-13 ENCOUNTER — TELEPHONE (OUTPATIENT)
Dept: PULMONOLOGY | Age: 73
End: 2023-07-13

## 2023-07-13 ENCOUNTER — ANESTHESIA EVENT (OUTPATIENT)
Dept: ENDOSCOPY | Age: 73
End: 2023-07-13
Payer: MEDICARE

## 2023-07-13 ENCOUNTER — HOSPITAL ENCOUNTER (OUTPATIENT)
Age: 73
Setting detail: OUTPATIENT SURGERY
Discharge: HOME | End: 2023-07-13
Attending: INTERNAL MEDICINE | Admitting: INTERNAL MEDICINE
Payer: MEDICARE

## 2023-07-13 VITALS
TEMPERATURE: 98.1 F | RESPIRATION RATE: 16 BRPM | BODY MASS INDEX: 25.77 KG/M2 | OXYGEN SATURATION: 91 % | WEIGHT: 174 LBS | HEART RATE: 89 BPM | SYSTOLIC BLOOD PRESSURE: 113 MMHG | HEIGHT: 69 IN | DIASTOLIC BLOOD PRESSURE: 66 MMHG

## 2023-07-13 DIAGNOSIS — R13.19 ESOPHAGEAL DYSPHAGIA: ICD-10-CM

## 2023-07-13 DIAGNOSIS — R93.89 ABNORMAL CT SCAN: ICD-10-CM

## 2023-07-13 PROCEDURE — 6360000002 HC RX W HCPCS: Performed by: NURSE ANESTHETIST, CERTIFIED REGISTERED

## 2023-07-13 PROCEDURE — 88342 IMHCHEM/IMCYTCHM 1ST ANTB: CPT

## 2023-07-13 PROCEDURE — 3609012400 HC EGD TRANSORAL BIOPSY SINGLE/MULTIPLE: Performed by: INTERNAL MEDICINE

## 2023-07-13 PROCEDURE — 7100000010 HC PHASE II RECOVERY - FIRST 15 MIN: Performed by: INTERNAL MEDICINE

## 2023-07-13 PROCEDURE — 7100000011 HC PHASE II RECOVERY - ADDTL 15 MIN: Performed by: INTERNAL MEDICINE

## 2023-07-13 PROCEDURE — 3700000001 HC ADD 15 MINUTES (ANESTHESIA): Performed by: INTERNAL MEDICINE

## 2023-07-13 PROCEDURE — 2580000003 HC RX 258: Performed by: NURSE ANESTHETIST, CERTIFIED REGISTERED

## 2023-07-13 PROCEDURE — 3700000000 HC ANESTHESIA ATTENDED CARE: Performed by: INTERNAL MEDICINE

## 2023-07-13 PROCEDURE — 88305 TISSUE EXAM BY PATHOLOGIST: CPT

## 2023-07-13 PROCEDURE — 2500000003 HC RX 250 WO HCPCS: Performed by: NURSE ANESTHETIST, CERTIFIED REGISTERED

## 2023-07-13 PROCEDURE — 2709999900 HC NON-CHARGEABLE SUPPLY: Performed by: INTERNAL MEDICINE

## 2023-07-13 RX ORDER — SODIUM CHLORIDE 0.9 % (FLUSH) 0.9 %
5-40 SYRINGE (ML) INJECTION PRN
Status: CANCELLED | OUTPATIENT
Start: 2023-07-13

## 2023-07-13 RX ORDER — SODIUM CHLORIDE 0.9 % (FLUSH) 0.9 %
5-40 SYRINGE (ML) INJECTION EVERY 12 HOURS SCHEDULED
Status: CANCELLED | OUTPATIENT
Start: 2023-07-13

## 2023-07-13 RX ORDER — LABETALOL HYDROCHLORIDE 5 MG/ML
10 INJECTION, SOLUTION INTRAVENOUS
Status: CANCELLED | OUTPATIENT
Start: 2023-07-13

## 2023-07-13 RX ORDER — LIDOCAINE HYDROCHLORIDE 20 MG/ML
INJECTION, SOLUTION INFILTRATION; PERINEURAL PRN
Status: DISCONTINUED | OUTPATIENT
Start: 2023-07-13 | End: 2023-07-13 | Stop reason: SDUPTHER

## 2023-07-13 RX ORDER — ONDANSETRON 2 MG/ML
4 INJECTION INTRAMUSCULAR; INTRAVENOUS
Status: CANCELLED | OUTPATIENT
Start: 2023-07-13 | End: 2023-07-14

## 2023-07-13 RX ORDER — MEPERIDINE HYDROCHLORIDE 25 MG/ML
12.5 INJECTION INTRAMUSCULAR; INTRAVENOUS; SUBCUTANEOUS EVERY 5 MIN PRN
Status: CANCELLED | OUTPATIENT
Start: 2023-07-13

## 2023-07-13 RX ORDER — PROPOFOL 10 MG/ML
INJECTION, EMULSION INTRAVENOUS PRN
Status: DISCONTINUED | OUTPATIENT
Start: 2023-07-13 | End: 2023-07-13 | Stop reason: SDUPTHER

## 2023-07-13 RX ORDER — GLYCOPYRROLATE 0.2 MG/ML
INJECTION INTRAMUSCULAR; INTRAVENOUS PRN
Status: DISCONTINUED | OUTPATIENT
Start: 2023-07-13 | End: 2023-07-13 | Stop reason: SDUPTHER

## 2023-07-13 RX ORDER — SODIUM CHLORIDE 9 MG/ML
INJECTION, SOLUTION INTRAVENOUS PRN
Status: CANCELLED | OUTPATIENT
Start: 2023-07-13

## 2023-07-13 RX ORDER — DIPHENHYDRAMINE HYDROCHLORIDE 50 MG/ML
12.5 INJECTION INTRAMUSCULAR; INTRAVENOUS
Status: CANCELLED | OUTPATIENT
Start: 2023-07-13 | End: 2023-07-14

## 2023-07-13 RX ORDER — SODIUM CHLORIDE, SODIUM LACTATE, POTASSIUM CHLORIDE, CALCIUM CHLORIDE 600; 310; 30; 20 MG/100ML; MG/100ML; MG/100ML; MG/100ML
INJECTION, SOLUTION INTRAVENOUS CONTINUOUS PRN
Status: DISCONTINUED | OUTPATIENT
Start: 2023-07-13 | End: 2023-07-13 | Stop reason: SDUPTHER

## 2023-07-13 RX ORDER — OXYCODONE HYDROCHLORIDE 5 MG/1
5 TABLET ORAL PRN
Status: CANCELLED | OUTPATIENT
Start: 2023-07-13 | End: 2023-07-13

## 2023-07-13 RX ORDER — OXYCODONE HYDROCHLORIDE 5 MG/1
10 TABLET ORAL PRN
Status: CANCELLED | OUTPATIENT
Start: 2023-07-13 | End: 2023-07-13

## 2023-07-13 RX ADMIN — LIDOCAINE HYDROCHLORIDE 80 MG: 20 INJECTION, SOLUTION INFILTRATION; PERINEURAL at 13:19

## 2023-07-13 RX ADMIN — GLYCOPYRROLATE 0.1 MG: 0.2 INJECTION, SOLUTION INTRAMUSCULAR; INTRAVENOUS at 13:19

## 2023-07-13 RX ADMIN — SODIUM CHLORIDE, POTASSIUM CHLORIDE, SODIUM LACTATE AND CALCIUM CHLORIDE: 600; 310; 30; 20 INJECTION, SOLUTION INTRAVENOUS at 13:19

## 2023-07-13 RX ADMIN — PROPOFOL 200 MG: 10 INJECTION, EMULSION INTRAVENOUS at 13:19

## 2023-07-13 RX ADMIN — PROPOFOL 30 MG: 10 INJECTION, EMULSION INTRAVENOUS at 13:26

## 2023-07-13 ASSESSMENT — PAIN DESCRIPTION - DESCRIPTORS: DESCRIPTORS: ACHING

## 2023-07-13 ASSESSMENT — PAIN - FUNCTIONAL ASSESSMENT
PAIN_FUNCTIONAL_ASSESSMENT: 0-10
PAIN_FUNCTIONAL_ASSESSMENT: PREVENTS OR INTERFERES SOME ACTIVE ACTIVITIES AND ADLS

## 2023-07-13 NOTE — OP NOTE
[facility],  [address] [place] [address], [address] [facility],[address]  Phone: [phone]  [address] [facility], [address] [facility]  Phone: [phone]   SPJ:[phone]    EGD Procedure Note    Patient: Aleyda Capone  : 1950    Procedure: Esophagogastroduodenoscopy with biopsy    Date:  2023     Endoscopist:  Barney Reynolds MD    Referring Physician:  Jas Barron MD    Preoperative Diagnosis:  Abnormal CT scan [R93.89]  Esophageal dysphagia [R13.19]    Postoperative Diagnosis: Esophageal mass    Anesthesia: Anesthesia: MAC  Sedation: Propofol per anesthesia  Start Time: 13:21  Stop Time: 13:30  ASA Class: 2  Mallampati: II (soft palate, uvula, fauces visible)    Indications: This is a 75y.o. year old male with medical history of hypertension, BPH, former tobacco use referred for abnormal CT. patient reports dysphagia with regurgitation of food including liquids and solids over the past 4 months. Associated with generalized abdominal pain and about 30 pound unintentional weight loss, CT chest with contrast on 2023 noted thickened and irregular esophagus concerning for carcinoma. Multiple lung nodules concerning for metastasis. Mild mediastinal lymph nodes. Multiple liver metastasis. Multiple necrotic lymph nodes in the left abdomen. Peritoneum osseous metastases. 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 Procedure Details  Informed consent was obtained for the procedure, including conscious sedation. Risks of pancreatitis, infection, perforation, hemorrhage, adverse drug reaction and aspiration were discussed. The patient was placed in the left lateral decubitus position.   Based on the pre-procedure assessment, including review of the patient's medical history, medications, allergies, and review of systems, he had been deemed to be an appropriate candidate for the above IV sedation; he was therefore sedated with the

## 2023-07-13 NOTE — H&P
[place]   Pre-operative History and Physical    Patient: Nisreen Lawrence  : 1950  Acct#:     HISTORY OF PRESENT ILLNESS:    Indications: dysphagia    68-year-old male with medical history of hypertension, BPH, former tobacco use referred for abnormal CT. patient reports dysphagia with regurgitation of food including liquids and solids over the past 4 months. Associated with generalized abdominal pain and about 30 pound unintentional weight loss. Denies odynophagia, nausea, fever, chills,melena or hematochezia. CT chest with contrast on 2023 noted thickened and irregular esophagus concerning for carcinoma. Multiple lung nodules concerning for metastasis. Mild mediastinal lymph nodes. Multiple liver metastasis. Multiple necrotic lymph nodes in the left abdomen. Peritoneum osseous metastases. 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 Cologuard negative on 2023.       Past Medical History:        Diagnosis Date    Asthma     as child    BPH (benign prostatic hyperplasia)     Cellulitis of pubic region 2021    Cough     [place] (hard of hearing)     SLIGHT    Hx of primary hypertension     Ileitis     diet controlled    Lung mass     Wears dentures       Past Surgical History:        Procedure Laterality Date    BRONCHOSCOPY N/A 2023    ENDOBRONCHIAL ULTRASOUND WITH PATHOLOGY performed by Johan Dutton MD at  [facility]  2023    BRONCHOSCOPY/TRANSBRONCHIAL LUNG BIOPSY performed by Johan Dutton MD at [facility]  2023    BRONCHOSCOPY/TRANSBRONCHIAL LUNG BIOPSY ADDL LOBE performed by Johan Dutton MD at [address] [place] [address] Ne Bilateral     [address] Bilateral 2019    ROTATOR CUFF REPAIR Right 2009 formerly [facility] SURGERY      hydocell removed from Lt testicle      Medications Prior to

## 2023-07-13 NOTE — PROGRESS NOTES
Discharge instructions given to patient's wife Sarah Lori at this time, she states understanding and denies any questions.

## 2023-07-13 NOTE — TELEPHONE ENCOUNTER
Discussed with patient and wife results from EBUS with bronchoscopy on 7/11/23. Referral to Oncology was made and patient is currently get GI procedure this week. All questions were answered.     Ariadna Mendez MD  [facility] Pulmonary Critical Care

## 2023-07-13 NOTE — DISCHARGE INSTRUCTIONS
Dyan's office will call you with the biopsy findings. Call Dr. Antonio Johns if there are any GI concerns.  [phone]

## 2023-07-13 NOTE — ANESTHESIA POSTPROCEDURE EVALUATION
Department of Anesthesiology  Postprocedure Note    Patient: Meagan Dickson  MRN: [id number]  YOB: 1950  Date of evaluation: 7/13/2023      Procedure Summary     Date: 07/13/23 Room / Location: [address] [place] [address] / [facility]    Anesthesia Start: 1315 Anesthesia Stop: 7072    Procedure: EGD BIOPSY Diagnosis:       Abnormal CT scan      Esophageal dysphagia      (Abnormal CT scan [R93.89])      (Esophageal dysphagia [R13.19])    Surgeons: Suzie Amaya MD Responsible Provider: Johnny Ortiz MD    Anesthesia Type: General ASA Status: 3          Anesthesia Type: General    Malik Phase I: Malik Score: 10    Malik Phase II: Malik Score: 10      Anesthesia Post Evaluation    Patient location during evaluation: PACU  Patient participation: complete - patient participated  Level of consciousness: awake and alert  Airway patency: patent  Nausea & Vomiting: no nausea and no vomiting  Complications: no  Cardiovascular status: blood pressure returned to baseline  Respiratory status: acceptable  Hydration status: euvolemic  Comments: VSS on transfer to phase 2 recovery. No anesthetic complications.

## 2023-07-13 NOTE — ANESTHESIA PRE PROCEDURE
02/14/2021 06:06 AM     01/09/2023 02:26 PM    CO2 28 01/09/2023 02:26 PM    BUN 13 01/09/2023 02:26 PM    CREATININE 1.0 06/28/2023 05:35 PM    CREATININE 0.8 01/09/2023 02:26 PM    GFRAA >60 02/14/2021 06:06 AM    AGRATIO 2.0 01/09/2023 02:26 PM    LABGLOM >60 06/28/2023 05:35 PM    GLUCOSE 94 01/09/2023 02:26 PM    PROT 6.5 01/09/2023 02:26 PM    CALCIUM 9.5 01/09/2023 02:26 PM    BILITOT 0.4 01/09/2023 02:26 PM    ALKPHOS 90 01/09/2023 02:26 PM    AST 20 01/09/2023 02:26 PM    ALT 17 01/09/2023 02:26 PM       POC Tests: No results for input(s): POCGLU, POCNA, POCK, POCCL, POCBUN, POCHEMO, POCHCT in the last 72 hours. Coags:   Lab Results   Component Value Date/Time    PROTIME 15.3 07/11/2023 08:11 AM    INR 1.21 07/11/2023 08:11 AM       HCG (If Applicable): No results found for: PREGTESTUR, PREGSERUM, HCG, HCGQUANT     ABGs: No results found for: PHART, PO2ART, AJB0WSG, POC8DLX, BEART, D8ETTXZD     Type & Screen (If Applicable):  No results found for: LABABO, LABRH    Drug/Infectious Status (If Applicable):  No results found for: HIV, HEPCAB    COVID-19 Screening (If Applicable): No results found for: COVID19        Anesthesia Evaluation   no history of anesthetic complications:   Airway: Mallampati: II  TM distance: >3 FB   Neck ROM: limited  Mouth opening: > = 3 FB   Dental:    (+) upper dentures and lower dentures      Pulmonary:   (+) asthma:                           ROS comment: Lung mass, h/o tob   Cardiovascular:    (+) hypertension:,                   Neuro/Psych:   Negative Neuro/Psych ROS              GI/Hepatic/Renal: Neg GI/Hepatic/Renal ROS            Endo/Other: Negative Endo/Other ROS                    Abdominal:             Vascular: negative vascular ROS. Other Findings:           Anesthesia Plan      MAC     ASA 3     (Risks, benefits and alternatives of MAC anesthesia discussed with pt. Questions answered. Willing to proceed.)  Induction: intravenous.       Anesthetic plan

## 2023-07-18 ENCOUNTER — TELEPHONE (OUTPATIENT)
Dept: SURGERY | Age: 73
End: 2023-07-18

## 2023-07-18 NOTE — TELEPHONE ENCOUNTER
Spoke with Lizzie Beckett and scheduled patient for a port placement on 7/25/23 at 2:30 pm and an arrival time of 12:30 pm with Dr. Montes [place] after midnight narda before surgery - Patient will need  day of surgery - [facility] completed pre-op exam - Sita understood and agreed with above noted.

## 2023-07-19 RX ORDER — ONDANSETRON HYDROCHLORIDE 8 MG/1
1 TABLET, FILM COATED ORAL
Status: ON HOLD | COMMUNITY
Start: 2023-07-17 | End: 2023-07-31 | Stop reason: HOSPADM

## 2023-07-19 RX ORDER — PROCHLORPERAZINE MALEATE 10 MG
TABLET ORAL
Status: ON HOLD | COMMUNITY
Start: 2023-07-17 | End: 2023-07-31 | Stop reason: HOSPADM

## 2023-07-19 RX ORDER — OXYCODONE HYDROCHLORIDE 5 MG/1
TABLET ORAL
Status: ON HOLD | COMMUNITY
Start: 2023-07-14 | End: 2023-07-31 | Stop reason: HOSPADM

## 2023-07-21 ENCOUNTER — HOSPITAL ENCOUNTER (OUTPATIENT)
Dept: CT IMAGING | Age: 73
Discharge: HOME | End: 2023-07-21
Attending: INTERNAL MEDICINE

## 2023-07-21 ENCOUNTER — HOSPITAL ENCOUNTER (OUTPATIENT)
Dept: GENERAL RADIOLOGY | Age: 73
Discharge: HOME | End: 2023-07-21
Payer: MEDICARE

## 2023-07-21 DIAGNOSIS — C15.5 ADENOCARCINOMA OF LOWER ESOPHAGUS (HCC): ICD-10-CM

## 2023-07-21 DIAGNOSIS — C15.9 MALIGNANT NEOPLASM OF ESOPHAGUS, UNSPECIFIED LOCATION (HCC): ICD-10-CM

## 2023-07-21 DIAGNOSIS — C79.51 METASTASIS TO BONE (HCC): ICD-10-CM

## 2023-07-21 PROCEDURE — 73552 X-RAY EXAM OF FEMUR 2/>: CPT

## 2023-07-21 NOTE — TELEPHONE ENCOUNTER
Pt is scheduled for port placement on 7/25. He also needs a J-Tube, is this something you will do at the same time or separate? If same time does pt need consultation for J-Tube?

## 2023-07-21 NOTE — TELEPHONE ENCOUNTER
Spoke to [place] at [facility] again. This pt is scheduled for Port placement on 7/25 with Dr. Luis Camacho, but he is also needing a Jtube placement. So you will need to let surgery know to add on Jtube placement also. Thank you!

## 2023-07-21 NOTE — TELEPHONE ENCOUNTER
Veronica from [facility] called regarding pt. Pt and wife would like to know if he will also be getting a G-tube along with the port placement when he has sx on 7/25. Please give Veronica a call, thank you. Izzy Pacheco [phone] ext.  [phone]

## 2023-07-24 ENCOUNTER — ANESTHESIA EVENT (OUTPATIENT)
Dept: OPERATING ROOM | Age: 73
End: 2023-07-24
Payer: MEDICARE

## 2023-07-24 ENCOUNTER — INITIAL CONSULT (OUTPATIENT)
Dept: SURGERY | Age: 73
End: 2023-07-24
Payer: MEDICARE

## 2023-07-24 VITALS
BODY MASS INDEX: 23.88 KG/M2 | WEIGHT: 161.2 LBS | SYSTOLIC BLOOD PRESSURE: 100 MMHG | HEIGHT: 69 IN | DIASTOLIC BLOOD PRESSURE: 60 MMHG

## 2023-07-24 DIAGNOSIS — C15.4 MALIGNANT NEOPLASM OF MIDDLE THIRD OF ESOPHAGUS (HCC): Primary | ICD-10-CM

## 2023-07-24 PROBLEM — C78.7 MALIGNANT NEOPLASM METASTATIC TO LIVER (HCC): Status: ACTIVE | Noted: 2023-07-14

## 2023-07-24 PROBLEM — C78.6 MALIGNANT NEOPLASM METASTATIC TO PERITONEUM (HCC): Status: ACTIVE | Noted: 2023-01-01

## 2023-07-24 PROBLEM — C78.02 MALIGNANT NEOPLASM METASTATIC TO LEFT LUNG (HCC): Status: ACTIVE | Noted: 2023-01-01

## 2023-07-24 PROBLEM — R06.02 SHORTNESS OF BREATH: Status: ACTIVE | Noted: 2023-01-01

## 2023-07-24 PROBLEM — C79.51 MALIGNANT NEOPLASM METASTATIC TO BONE (HCC): Status: ACTIVE | Noted: 2023-01-01

## 2023-07-24 PROBLEM — C15.9 MALIGNANT NEOPLASM OF ESOPHAGUS (HCC): Status: ACTIVE | Noted: 2023-01-01

## 2023-07-24 PROCEDURE — G8427 DOCREV CUR MEDS BY ELIG CLIN: HCPCS | Performed by: SURGERY

## 2023-07-24 PROCEDURE — 3017F COLORECTAL CA SCREEN DOC REV: CPT | Performed by: SURGERY

## 2023-07-24 PROCEDURE — 1123F ACP DISCUSS/DSCN MKR DOCD: CPT | Performed by: SURGERY

## 2023-07-24 PROCEDURE — 99204 OFFICE O/P NEW MOD 45 MIN: CPT | Performed by: SURGERY

## 2023-07-24 PROCEDURE — 1036F TOBACCO NON-USER: CPT | Performed by: SURGERY

## 2023-07-24 PROCEDURE — G8420 CALC BMI NORM PARAMETERS: HCPCS | Performed by: SURGERY

## 2023-07-24 RX ORDER — EPINEPHRINE 1 MG/ML
0.3 INJECTION, SOLUTION, CONCENTRATE INTRAVENOUS
Status: ON HOLD | COMMUNITY
Start: 2023-07-27

## 2023-07-24 RX ORDER — DEXAMETHASONE SODIUM PHOSPHATE 4 MG/ML
10 INJECTION, SOLUTION INTRA-ARTICULAR; INTRALESIONAL; INTRAMUSCULAR; INTRAVENOUS; SOFT TISSUE
Status: ON HOLD | COMMUNITY
Start: 2023-07-27

## 2023-07-24 RX ORDER — HEPARIN SODIUM 5000 [USP'U]/ML
5000 INJECTION, SOLUTION INTRAVENOUS; SUBCUTANEOUS EVERY 8 HOURS SCHEDULED
Status: CANCELLED | OUTPATIENT
Start: 2023-07-24

## 2023-07-24 RX ORDER — SODIUM CHLORIDE 0.9 % (FLUSH) 0.9 %
5-40 SYRINGE (ML) INJECTION EVERY 12 HOURS SCHEDULED
Status: CANCELLED | OUTPATIENT
Start: 2023-07-24

## 2023-07-24 RX ORDER — SODIUM CHLORIDE 0.9 % (FLUSH) 0.9 %
5-40 SYRINGE (ML) INJECTION PRN
Status: CANCELLED | OUTPATIENT
Start: 2023-07-24

## 2023-07-24 RX ORDER — PALONOSETRON 0.05 MG/ML
0.25 INJECTION, SOLUTION INTRAVENOUS
Status: ON HOLD | COMMUNITY
Start: 2023-07-27

## 2023-07-24 RX ORDER — FAMOTIDINE 10 MG/ML
20 INJECTION, SOLUTION INTRAVENOUS
Status: ON HOLD | COMMUNITY
Start: 2023-07-27

## 2023-07-24 RX ORDER — PACLITAXEL 6 MG/ML
96 INJECTION, SOLUTION INTRAVENOUS
Status: ON HOLD | COMMUNITY
Start: 2023-07-27

## 2023-07-24 RX ORDER — CARBOPLATIN 10 MG/ML
190 INJECTION INTRAVENOUS
Status: ON HOLD | COMMUNITY
Start: 2023-07-27

## 2023-07-24 RX ORDER — SODIUM CHLORIDE 9 MG/ML
INJECTION, SOLUTION INTRAVENOUS PRN
Status: CANCELLED | OUTPATIENT
Start: 2023-07-24

## 2023-07-24 NOTE — PROGRESS NOTES
New Patient [id number] Monsharda Reeves MD    [address] [address]  [place], [address]  [address]   YOB: 1950    Date of Visit:  7/24/2023    Pankaj SPENCER MD    Chief Complaint: Weakness, fatigue, and dysphagia    HPI: Patient presents for evaluation of some dysphagia. He he was received diagnosed with esophageal cancer. He can hardly swallow small amounts of water. He is feeling very weak, fatigued, and dehydrated.   He had an esophagogastroduodenoscopy that showed a large nearly obstructing esophageal mass    No Known Allergies  Outpatient Medications Marked as Taking for the 7/24/23 encounter (Initial consult) with Bruce Zamorano MD   Medication Sig Dispense Refill    [START ON 7/27/2023] palonosetron (ALOXI) 0.25 MG/5ML injection Infuse 5 mLs intravenously      [START ON 7/27/2023] PACLitaxel (TAXOL) 30 MG/5ML chemo injection Infuse 16 mLs intravenously      [START ON 7/27/2023] methylPREDNISolone sodium succinate (SOLU-MEDROL) 2000 MG injection Infuse 125 mg intravenously      [START ON 7/27/2023] hydrocortisone sodium succinate PF (SOLU-CORTEF) 100 MG SOLR injection Infuse 2 mLs intravenously      [START ON 7/27/2023] famotidine (PEPCID) 20 MG/2ML SOLN injection Infuse 2 mLs intravenously      [START ON 7/27/2023] EPINEPHrine PF 1 MG/ML injection (Anaphylaxis) Inject 0.3 mLs into the muscle      [START ON 7/27/2023] dexamethasone (DECADRON) 4 MG/ML injection Infuse 2.5 mLs intravenously      [START ON 7/27/2023] CARBOplatin (PARAPLATIN) 50 MG/5ML chemo injection Infuse 19 mLs intravenously      oxyCODONE (ROXICODONE) 5 MG immediate release tablet TAKE 1 TABLET BY MOUTH EVERY 4 TO 6 HOURS AS NEEDED FOR PAIN      prochlorperazine (COMPAZINE) 10 MG tablet       ondansetron (ZOFRAN) 8 MG tablet Take 1 tablet by mouth      albuterol sulfate HFA (VENTOLIN HFA) 108 (90 Base) MCG/ACT inhaler Inhale

## 2023-07-25 ENCOUNTER — APPOINTMENT (OUTPATIENT)
Dept: GENERAL RADIOLOGY | Age: 73
DRG: 356 | End: 2023-07-25
Attending: SURGERY
Payer: MEDICARE

## 2023-07-25 ENCOUNTER — HOSPITAL ENCOUNTER (INPATIENT)
Age: 73
LOS: 7 days | Discharge: HOME HEALTH | DRG: 356 | End: 2023-08-01
Attending: SURGERY | Admitting: SURGERY
Payer: MEDICARE

## 2023-07-25 ENCOUNTER — ANESTHESIA (OUTPATIENT)
Dept: OPERATING ROOM | Age: 73
End: 2023-07-25
Payer: MEDICARE

## 2023-07-25 DIAGNOSIS — C78.6 MALIGNANT NEOPLASM METASTATIC TO PERITONEUM (HCC): ICD-10-CM

## 2023-07-25 DIAGNOSIS — C79.51 MALIGNANT NEOPLASM METASTATIC TO BONE (HCC): ICD-10-CM

## 2023-07-25 DIAGNOSIS — J43.9 PULMONARY EMPHYSEMA, UNSPECIFIED EMPHYSEMA TYPE (HCC): Primary | ICD-10-CM

## 2023-07-25 DIAGNOSIS — C78.02 MALIGNANT NEOPLASM METASTATIC TO LEFT LUNG (HCC): ICD-10-CM

## 2023-07-25 DIAGNOSIS — C15.4 MALIGNANT NEOPLASM OF MIDDLE THIRD OF ESOPHAGUS (HCC): ICD-10-CM

## 2023-07-25 DIAGNOSIS — C78.7 MALIGNANT NEOPLASM METASTATIC TO LIVER (HCC): ICD-10-CM

## 2023-07-25 PROBLEM — C15.9 ESOPHAGEAL CANCER (HCC): Status: ACTIVE | Noted: 2023-01-01

## 2023-07-25 LAB
BASE EXCESS BLDA CALC-SCNC: 6.2 MMOL/L (ref -3–3)
CO2 BLDA-SCNC: 33.5 MMOL/L
COHGB MFR BLDA: 0.3 % (ref 0–1.5)
HCO3 BLDA-SCNC: 32 MMOL/L (ref 21–29)
HGB BLDA-MCNC: 11 G/DL (ref 13.5–17.5)
METHGB MFR BLDA: 0.5 %
NT-PROBNP SERPL-MCNC: 1267 PG/ML (ref 0–449)
O2 THERAPY: ABNORMAL
PCO2 BLDA: 51.7 MMHG (ref 35–45)
PH BLDA: 7.41 [PH] (ref 7.35–7.45)
PO2 BLDA: 65.2 MMHG (ref 75–108)
SAO2 % BLDA: 90.3 %

## 2023-07-25 PROCEDURE — 94761 N-INVAS EAR/PLS OXIMETRY MLT: CPT

## 2023-07-25 PROCEDURE — 6360000002 HC RX W HCPCS: Performed by: NURSE ANESTHETIST, CERTIFIED REGISTERED

## 2023-07-25 PROCEDURE — C9399 UNCLASSIFIED DRUGS OR BIOLOG: HCPCS | Performed by: NURSE ANESTHETIST, CERTIFIED REGISTERED

## 2023-07-25 PROCEDURE — 2580000003 HC RX 258: Performed by: NURSE ANESTHETIST, CERTIFIED REGISTERED

## 2023-07-25 PROCEDURE — 2709999900 HC NON-CHARGEABLE SUPPLY: Performed by: SURGERY

## 2023-07-25 PROCEDURE — 36415 COLL VENOUS BLD VENIPUNCTURE: CPT

## 2023-07-25 PROCEDURE — 71045 X-RAY EXAM CHEST 1 VIEW: CPT

## 2023-07-25 PROCEDURE — 2580000003 HC RX 258: Performed by: SURGERY

## 2023-07-25 PROCEDURE — 7100000001 HC PACU RECOVERY - ADDTL 15 MIN: Performed by: SURGERY

## 2023-07-25 PROCEDURE — 2700000000 HC OXYGEN THERAPY PER DAY

## 2023-07-25 PROCEDURE — 3700000001 HC ADD 15 MINUTES (ANESTHESIA): Performed by: SURGERY

## 2023-07-25 PROCEDURE — C1788 PORT, INDWELLING, IMP: HCPCS | Performed by: SURGERY

## 2023-07-25 PROCEDURE — 77001 FLUOROGUIDE FOR VEIN DEVICE: CPT

## 2023-07-25 PROCEDURE — 94640 AIRWAY INHALATION TREATMENT: CPT

## 2023-07-25 PROCEDURE — 83880 ASSAY OF NATRIURETIC PEPTIDE: CPT

## 2023-07-25 PROCEDURE — 36561 INSERT TUNNELED CV CATH: CPT | Performed by: SURGERY

## 2023-07-25 PROCEDURE — 0JH60WZ INSERTION OF TOTALLY IMPLANTABLE VASCULAR ACCESS DEVICE INTO CHEST SUBCUTANEOUS TISSUE AND FASCIA, OPEN APPROACH: ICD-10-PCS | Performed by: SURGERY

## 2023-07-25 PROCEDURE — A4217 STERILE WATER/SALINE, 500 ML: HCPCS | Performed by: SURGERY

## 2023-07-25 PROCEDURE — 6360000002 HC RX W HCPCS: Performed by: SURGERY

## 2023-07-25 PROCEDURE — 02HV33Z INSERTION OF INFUSION DEVICE INTO SUPERIOR VENA CAVA, PERCUTANEOUS APPROACH: ICD-10-PCS | Performed by: SURGERY

## 2023-07-25 PROCEDURE — 3700000000 HC ANESTHESIA ATTENDED CARE: Performed by: SURGERY

## 2023-07-25 PROCEDURE — 0DHA0UZ INSERTION OF FEEDING DEVICE INTO JEJUNUM, OPEN APPROACH: ICD-10-PCS | Performed by: SURGERY

## 2023-07-25 PROCEDURE — 82803 BLOOD GASES ANY COMBINATION: CPT

## 2023-07-25 PROCEDURE — 2500000003 HC RX 250 WO HCPCS: Performed by: NURSE ANESTHETIST, CERTIFIED REGISTERED

## 2023-07-25 PROCEDURE — 44300 OPEN BOWEL TO SKIN: CPT | Performed by: SURGERY

## 2023-07-25 PROCEDURE — 3E0H76Z INTRODUCTION OF NUTRITIONAL SUBSTANCE INTO LOWER GI, VIA NATURAL OR ARTIFICIAL OPENING: ICD-10-PCS | Performed by: SURGERY

## 2023-07-25 PROCEDURE — 7100000000 HC PACU RECOVERY - FIRST 15 MIN: Performed by: SURGERY

## 2023-07-25 PROCEDURE — 3600000002 HC SURGERY LEVEL 2 BASE: Performed by: SURGERY

## 2023-07-25 PROCEDURE — 3600000012 HC SURGERY LEVEL 2 ADDTL 15MIN: Performed by: SURGERY

## 2023-07-25 PROCEDURE — 6370000000 HC RX 637 (ALT 250 FOR IP): Performed by: SURGERY

## 2023-07-25 PROCEDURE — 1200000000 HC SEMI PRIVATE

## 2023-07-25 PROCEDURE — 43762 RPLC GTUBE NO REVJ TRC: CPT

## 2023-07-25 PROCEDURE — 36600 WITHDRAWAL OF ARTERIAL BLOOD: CPT

## 2023-07-25 PROCEDURE — 6360000002 HC RX W HCPCS: Performed by: ANESTHESIOLOGY

## 2023-07-25 PROCEDURE — C1769 GUIDE WIRE: HCPCS | Performed by: SURGERY

## 2023-07-25 PROCEDURE — 77001 FLUOROGUIDE FOR VEIN DEVICE: CPT | Performed by: SURGERY

## 2023-07-25 DEVICE — POWERPORT CLEARVUE ISP IMPLANTABLE PORT WITH ATTACHABLE 8F POLYURETHANE OPEN-ENDED SINGLE-LUMEN VENOUS CATHETER PROCEDURAL KIT
Type: IMPLANTABLE DEVICE | Site: SUBCLAVIAN | Status: FUNCTIONAL
Brand: POWERPORT CLEARVUE

## 2023-07-25 RX ORDER — SODIUM CHLORIDE 0.9 % (FLUSH) 0.9 %
5-40 SYRINGE (ML) INJECTION EVERY 12 HOURS SCHEDULED
Status: DISCONTINUED | OUTPATIENT
Start: 2023-07-25 | End: 2023-07-25 | Stop reason: HOSPADM

## 2023-07-25 RX ORDER — OXYCODONE HYDROCHLORIDE 5 MG/1
10 TABLET ORAL PRN
Status: DISCONTINUED | OUTPATIENT
Start: 2023-07-25 | End: 2023-07-25 | Stop reason: HOSPADM

## 2023-07-25 RX ORDER — METRONIDAZOLE 500 MG/100ML
500 INJECTION, SOLUTION INTRAVENOUS ONCE
Status: COMPLETED | OUTPATIENT
Start: 2023-07-25 | End: 2023-07-25

## 2023-07-25 RX ORDER — ALBUTEROL SULFATE 90 UG/1
2 AEROSOL, METERED RESPIRATORY (INHALATION) 4 TIMES DAILY PRN
Status: DISCONTINUED | OUTPATIENT
Start: 2023-07-25 | End: 2023-08-01 | Stop reason: HOSPADM

## 2023-07-25 RX ORDER — HEPARIN SODIUM 5000 [USP'U]/ML
5000 INJECTION, SOLUTION INTRAVENOUS; SUBCUTANEOUS EVERY 8 HOURS SCHEDULED
Status: DISCONTINUED | OUTPATIENT
Start: 2023-07-25 | End: 2023-07-25 | Stop reason: HOSPADM

## 2023-07-25 RX ORDER — CEFAZOLIN SODIUM IN 0.9 % NACL 2 G/100 ML
2000 PLASTIC BAG, INJECTION (ML) INTRAVENOUS
Status: DISPENSED | OUTPATIENT
Start: 2023-07-25 | End: 2023-07-25

## 2023-07-25 RX ORDER — SODIUM CHLORIDE 9 MG/ML
INJECTION, SOLUTION INTRAVENOUS PRN
Status: DISCONTINUED | OUTPATIENT
Start: 2023-07-25 | End: 2023-07-25 | Stop reason: HOSPADM

## 2023-07-25 RX ORDER — DEXAMETHASONE SODIUM PHOSPHATE 4 MG/ML
INJECTION, SOLUTION INTRA-ARTICULAR; INTRALESIONAL; INTRAMUSCULAR; INTRAVENOUS; SOFT TISSUE PRN
Status: DISCONTINUED | OUTPATIENT
Start: 2023-07-25 | End: 2023-07-25 | Stop reason: SDUPTHER

## 2023-07-25 RX ORDER — LIDOCAINE HYDROCHLORIDE 20 MG/ML
INJECTION, SOLUTION INFILTRATION; PERINEURAL PRN
Status: DISCONTINUED | OUTPATIENT
Start: 2023-07-25 | End: 2023-07-25 | Stop reason: SDUPTHER

## 2023-07-25 RX ORDER — SODIUM CHLORIDE 0.9 % (FLUSH) 0.9 %
5-40 SYRINGE (ML) INJECTION PRN
Status: DISCONTINUED | OUTPATIENT
Start: 2023-07-25 | End: 2023-07-25 | Stop reason: HOSPADM

## 2023-07-25 RX ORDER — TAMSULOSIN HYDROCHLORIDE 0.4 MG/1
0.4 CAPSULE ORAL EVERY EVENING
Status: DISCONTINUED | OUTPATIENT
Start: 2023-07-25 | End: 2023-08-01 | Stop reason: HOSPADM

## 2023-07-25 RX ORDER — PROPOFOL 10 MG/ML
INJECTION, EMULSION INTRAVENOUS PRN
Status: DISCONTINUED | OUTPATIENT
Start: 2023-07-25 | End: 2023-07-25 | Stop reason: SDUPTHER

## 2023-07-25 RX ORDER — ONDANSETRON 2 MG/ML
4 INJECTION INTRAMUSCULAR; INTRAVENOUS EVERY 4 HOURS PRN
Status: DISCONTINUED | OUTPATIENT
Start: 2023-07-25 | End: 2023-08-01 | Stop reason: HOSPADM

## 2023-07-25 RX ORDER — FLUTICASONE PROPIONATE 50 MCG
2 SPRAY, SUSPENSION (ML) NASAL DAILY
Status: DISCONTINUED | OUTPATIENT
Start: 2023-07-25 | End: 2023-08-01 | Stop reason: HOSPADM

## 2023-07-25 RX ORDER — FENTANYL CITRATE 50 UG/ML
INJECTION, SOLUTION INTRAMUSCULAR; INTRAVENOUS PRN
Status: DISCONTINUED | OUTPATIENT
Start: 2023-07-25 | End: 2023-07-25 | Stop reason: SDUPTHER

## 2023-07-25 RX ORDER — PHENYLEPHRINE HCL IN 0.9% NACL 1 MG/10 ML
SYRINGE (ML) INTRAVENOUS PRN
Status: DISCONTINUED | OUTPATIENT
Start: 2023-07-25 | End: 2023-07-25 | Stop reason: SDUPTHER

## 2023-07-25 RX ORDER — ONDANSETRON 2 MG/ML
4 INJECTION INTRAMUSCULAR; INTRAVENOUS
Status: DISCONTINUED | OUTPATIENT
Start: 2023-07-25 | End: 2023-07-25 | Stop reason: HOSPADM

## 2023-07-25 RX ORDER — MIDAZOLAM HYDROCHLORIDE 1 MG/ML
2 INJECTION INTRAMUSCULAR; INTRAVENOUS
Status: DISCONTINUED | OUTPATIENT
Start: 2023-07-25 | End: 2023-07-25 | Stop reason: HOSPADM

## 2023-07-25 RX ORDER — OXYCODONE HYDROCHLORIDE 5 MG/1
5 TABLET ORAL PRN
Status: DISCONTINUED | OUTPATIENT
Start: 2023-07-25 | End: 2023-07-25 | Stop reason: HOSPADM

## 2023-07-25 RX ORDER — LIDOCAINE HYDROCHLORIDE 10 MG/ML
1 INJECTION, SOLUTION EPIDURAL; INFILTRATION; INTRACAUDAL; PERINEURAL
Status: DISCONTINUED | OUTPATIENT
Start: 2023-07-25 | End: 2023-07-25 | Stop reason: HOSPADM

## 2023-07-25 RX ORDER — ONDANSETRON 2 MG/ML
INJECTION INTRAMUSCULAR; INTRAVENOUS PRN
Status: DISCONTINUED | OUTPATIENT
Start: 2023-07-25 | End: 2023-07-25 | Stop reason: SDUPTHER

## 2023-07-25 RX ORDER — ALBUTEROL SULFATE 2.5 MG/3ML
2.5 SOLUTION RESPIRATORY (INHALATION)
Status: DISCONTINUED | OUTPATIENT
Start: 2023-07-26 | End: 2023-08-01

## 2023-07-25 RX ORDER — SUCCINYLCHOLINE CHLORIDE 20 MG/ML
INJECTION INTRAMUSCULAR; INTRAVENOUS PRN
Status: DISCONTINUED | OUTPATIENT
Start: 2023-07-25 | End: 2023-07-25 | Stop reason: SDUPTHER

## 2023-07-25 RX ORDER — BUPIVACAINE HYDROCHLORIDE 5 MG/ML
INJECTION, SOLUTION EPIDURAL; INTRACAUDAL PRN
Status: DISCONTINUED | OUTPATIENT
Start: 2023-07-25 | End: 2023-07-25 | Stop reason: ALTCHOICE

## 2023-07-25 RX ORDER — LABETALOL HYDROCHLORIDE 5 MG/ML
5 INJECTION, SOLUTION INTRAVENOUS EVERY 10 MIN PRN
Status: DISCONTINUED | OUTPATIENT
Start: 2023-07-25 | End: 2023-07-25 | Stop reason: HOSPADM

## 2023-07-25 RX ORDER — ROCURONIUM BROMIDE 10 MG/ML
INJECTION, SOLUTION INTRAVENOUS PRN
Status: DISCONTINUED | OUTPATIENT
Start: 2023-07-25 | End: 2023-07-25 | Stop reason: SDUPTHER

## 2023-07-25 RX ORDER — SODIUM CHLORIDE 9 MG/ML
INJECTION, SOLUTION INTRAVENOUS CONTINUOUS
Status: DISCONTINUED | OUTPATIENT
Start: 2023-07-25 | End: 2023-07-29

## 2023-07-25 RX ORDER — DIPHENHYDRAMINE HYDROCHLORIDE 50 MG/ML
12.5 INJECTION INTRAMUSCULAR; INTRAVENOUS
Status: DISCONTINUED | OUTPATIENT
Start: 2023-07-25 | End: 2023-07-25 | Stop reason: HOSPADM

## 2023-07-25 RX ORDER — SODIUM CHLORIDE, SODIUM LACTATE, POTASSIUM CHLORIDE, CALCIUM CHLORIDE 600; 310; 30; 20 MG/100ML; MG/100ML; MG/100ML; MG/100ML
INJECTION, SOLUTION INTRAVENOUS CONTINUOUS PRN
Status: DISCONTINUED | OUTPATIENT
Start: 2023-07-25 | End: 2023-07-25 | Stop reason: SDUPTHER

## 2023-07-25 RX ORDER — SODIUM CHLORIDE, SODIUM LACTATE, POTASSIUM CHLORIDE, CALCIUM CHLORIDE 600; 310; 30; 20 MG/100ML; MG/100ML; MG/100ML; MG/100ML
INJECTION, SOLUTION INTRAVENOUS CONTINUOUS
Status: DISCONTINUED | OUTPATIENT
Start: 2023-07-25 | End: 2023-07-25 | Stop reason: HOSPADM

## 2023-07-25 RX ADMIN — FENTANYL CITRATE 25 MCG: 50 INJECTION, SOLUTION INTRAMUSCULAR; INTRAVENOUS at 14:16

## 2023-07-25 RX ADMIN — Medication 50 MCG: at 14:38

## 2023-07-25 RX ADMIN — SODIUM CHLORIDE, SODIUM LACTATE, POTASSIUM CHLORIDE, AND CALCIUM CHLORIDE: .6; .31; .03; .02 INJECTION, SOLUTION INTRAVENOUS at 14:05

## 2023-07-25 RX ADMIN — Medication 2 PUFF: at 23:40

## 2023-07-25 RX ADMIN — SUGAMMADEX 200 MG: 100 INJECTION, SOLUTION INTRAVENOUS at 15:16

## 2023-07-25 RX ADMIN — SODIUM CHLORIDE: 9 INJECTION, SOLUTION INTRAVENOUS at 21:01

## 2023-07-25 RX ADMIN — ONDANSETRON 4 MG: 2 INJECTION INTRAMUSCULAR; INTRAVENOUS at 14:44

## 2023-07-25 RX ADMIN — ROCURONIUM BROMIDE 20 MG: 50 INJECTION, SOLUTION INTRAVENOUS at 14:52

## 2023-07-25 RX ADMIN — DEXAMETHASONE SODIUM PHOSPHATE 8 MG: 4 INJECTION, SOLUTION INTRAMUSCULAR; INTRAVENOUS at 14:21

## 2023-07-25 RX ADMIN — TAMSULOSIN HYDROCHLORIDE 0.4 MG: 0.4 CAPSULE ORAL at 23:27

## 2023-07-25 RX ADMIN — FENTANYL CITRATE 25 MCG: 50 INJECTION, SOLUTION INTRAMUSCULAR; INTRAVENOUS at 14:53

## 2023-07-25 RX ADMIN — SUCCINYLCHOLINE CHLORIDE 100 MG: 20 INJECTION, SOLUTION INTRAMUSCULAR; INTRAVENOUS at 14:16

## 2023-07-25 RX ADMIN — HYDROMORPHONE HYDROCHLORIDE 0.5 MG: 1 INJECTION, SOLUTION INTRAMUSCULAR; INTRAVENOUS; SUBCUTANEOUS at 15:43

## 2023-07-25 RX ADMIN — HYDROMORPHONE HYDROCHLORIDE 0.5 MG: 1 INJECTION, SOLUTION INTRAMUSCULAR; INTRAVENOUS; SUBCUTANEOUS at 15:54

## 2023-07-25 RX ADMIN — FENTANYL CITRATE 50 MCG: 50 INJECTION, SOLUTION INTRAMUSCULAR; INTRAVENOUS at 15:29

## 2023-07-25 RX ADMIN — METHOCARBAMOL 500 MG: 100 INJECTION INTRAMUSCULAR; INTRAVENOUS at 14:50

## 2023-07-25 RX ADMIN — LIDOCAINE HYDROCHLORIDE 100 MG: 20 INJECTION, SOLUTION INFILTRATION; PERINEURAL at 14:16

## 2023-07-25 RX ADMIN — FLUTICASONE PROPIONATE 2 SPRAY: 50 SPRAY, METERED NASAL at 23:27

## 2023-07-25 RX ADMIN — ROCURONIUM BROMIDE 20 MG: 50 INJECTION, SOLUTION INTRAVENOUS at 14:28

## 2023-07-25 RX ADMIN — HEPARIN SODIUM 5000 UNITS: 5000 INJECTION INTRAVENOUS; SUBCUTANEOUS at 13:07

## 2023-07-25 RX ADMIN — Medication 50 MCG: at 14:26

## 2023-07-25 RX ADMIN — METRONIDAZOLE 500 MG: 500 INJECTION, SOLUTION INTRAVENOUS at 14:30

## 2023-07-25 RX ADMIN — Medication 2 PUFF: at 21:14

## 2023-07-25 RX ADMIN — PROPOFOL 100 MG: 10 INJECTION, EMULSION INTRAVENOUS at 14:16

## 2023-07-25 RX ADMIN — ROCURONIUM BROMIDE 10 MG: 50 INJECTION, SOLUTION INTRAVENOUS at 14:15

## 2023-07-25 ASSESSMENT — PAIN DESCRIPTION - LOCATION: LOCATION: ABDOMEN

## 2023-07-25 ASSESSMENT — PAIN DESCRIPTION - PAIN TYPE: TYPE: SURGICAL PAIN

## 2023-07-25 ASSESSMENT — PAIN SCALES - GENERAL
PAINLEVEL_OUTOF10: 8
PAINLEVEL_OUTOF10: 8

## 2023-07-25 ASSESSMENT — ENCOUNTER SYMPTOMS: SHORTNESS OF BREATH: 1

## 2023-07-25 ASSESSMENT — PAIN - FUNCTIONAL ASSESSMENT: PAIN_FUNCTIONAL_ASSESSMENT: 0-10

## 2023-07-25 NOTE — ANESTHESIA PRE PROCEDURE
Department of Anesthesiology  Preprocedure Note       Name:  Debo Lowry   Age:  75 y.o.  :  1950                                          MRN:  [id number]         Date:  2023      Surgeon: Yoly Valverde):  Paola Coyle MD    Procedure: Procedure(s):  SURGICAL PORT PLACEMENT  JEJUNOSTOMY TUBE PLACEMENT    Medications prior to admission:   Prior to Admission medications    Medication Sig Start Date End Date Taking?  Authorizing Provider   ondansetron (ZOFRAN) 8 MG tablet Take 1 tablet by mouth 23  Yes Historical Provider, MD   palonosetron (ALOXI) 0.25 MG/5ML injection Infuse 5 mLs intravenously 23   Historical Provider, MD   PACLitaxel (TAXOL) 30 MG/5ML chemo injection Infuse 16 mLs intravenously 23   Historical Provider, MD   methylPREDNISolone sodium succinate (SOLU-MEDROL) 2000 MG injection Infuse 125 mg intravenously 23   Historical Provider, MD   hydrocortisone sodium succinate PF (SOLU-CORTEF) 100 MG SOLR injection Infuse 2 mLs intravenously 23   Historical Provider, MD   famotidine (PEPCID) 20 MG/2ML SOLN injection Infuse 2 mLs intravenously 23   Historical Provider, MD   EPINEPHrine PF 1 MG/ML injection (Anaphylaxis) Inject 0.3 mLs into the muscle 23   Historical Provider, MD   dexamethasone (DECADRON) 4 MG/ML injection Infuse 2.5 mLs intravenously 23   Historical Provider, MD   CARBOplatin (PARAPLATIN) 50 MG/5ML chemo injection Infuse 19 mLs intravenously 23   Historical Provider, MD   oxyCODONE (ROXICODONE) 5 MG immediate release tablet TAKE 1 TABLET BY MOUTH EVERY 4 TO 6 HOURS AS NEEDED FOR PAIN 23   Historical Provider, MD   prochlorperazine (COMPAZINE) 10 MG tablet  23   Historical Provider, MD   albuterol sulfate HFA (VENTOLIN HFA) 108 (90 Base) MCG/ACT inhaler Inhale 2 puffs into the lungs 4 times daily as needed for Wheezing 23   Juliana Hunt MD   fluticasone-umeclidin-vilant ([place]) 149-14.5-90 MCG/ACT AEPB

## 2023-07-25 NOTE — H&P
I have reviewed the progress note serving as history and physical dated July/24/2023 and examined the patient and find no relevant changes. I have reviewed with the patient and/or family the risks, benefits, and alternatives to the procedure.

## 2023-07-25 NOTE — BRIEF OP NOTE
Brief Postoperative Note      Patient: Larissa Tapia  YOB: 1950  MRN: [id number]    Date of Procedure: 7/25/2023    Pre-Op Diagnosis Codes:     * Malignant neoplasm of abdominal esophagus ([address]) [C15.5]    Post-Op Diagnosis: Same       Procedure(s):  SURGICAL PORT PLACEMENT  JEJUNOSTOMY TUBE PLACEMENT    Surgeon(s):  Kathya Benito MD    Assistant:  Surgical Assistant: Nicole Roblero RN; Radha Proctor    Anesthesia: General    Estimated Blood Loss (mL): Minimal    Complications: None    Specimens:   * No specimens in log *    Implants:  Implant Name Type Inv.  Item Serial No.  Lot No. LRB No. Used Action   PORT INFUS SGL LUMN ATTCH POLYUR OPN END CATH 8FR POWERPRT - [id number]  PORT INFUS SGL LUMN ATTCH POLYUR OPN END CATH 8FR POWERPRT  [place] [employer] [id number] Left 1 Implanted         Drains:   Gastrostomy/Enterostomy/Jejunostomy Tube Feeding Jejunostomy LUQ 1 18 fr (Active)       Findings: as above      Electronically signed by Kathya Benito MD on 7/25/2023 at 3:18 PM

## 2023-07-26 ENCOUNTER — APPOINTMENT (OUTPATIENT)
Dept: CT IMAGING | Age: 73
DRG: 356 | End: 2023-07-26
Attending: SURGERY
Payer: MEDICARE

## 2023-07-26 LAB
BASOPHILS # BLD: 0 K/UL (ref 0–0.2)
BASOPHILS # BLD: 0 K/UL (ref 0–0.2)
BASOPHILS NFR BLD: 0.1 %
BASOPHILS NFR BLD: 0.2 %
BILIRUB UR QL STRIP.AUTO: NEGATIVE
CLARITY UR: CLEAR
COLOR UR: YELLOW
DEPRECATED RDW RBC AUTO: 17.3 % (ref 12.4–15.4)
DEPRECATED RDW RBC AUTO: 17.4 % (ref 12.4–15.4)
EOSINOPHIL # BLD: 0 K/UL (ref 0–0.6)
EOSINOPHIL # BLD: 0 K/UL (ref 0–0.6)
EOSINOPHIL NFR BLD: 0 %
EOSINOPHIL NFR BLD: 0 %
GLUCOSE UR STRIP.AUTO-MCNC: NEGATIVE MG/DL
HCT VFR BLD AUTO: 29 % (ref 40.5–52.5)
HCT VFR BLD AUTO: 29.9 % (ref 40.5–52.5)
HGB BLD-MCNC: 9.2 G/DL (ref 13.5–17.5)
HGB BLD-MCNC: 9.4 G/DL (ref 13.5–17.5)
HGB UR QL STRIP.AUTO: NEGATIVE
KETONES UR STRIP.AUTO-MCNC: NEGATIVE MG/DL
LACTATE BLDV-SCNC: 1.4 MMOL/L (ref 0.4–1.9)
LEUKOCYTE ESTERASE UR QL STRIP.AUTO: NEGATIVE
LYMPHOCYTES # BLD: 0.6 K/UL (ref 1–5.1)
LYMPHOCYTES # BLD: 0.6 K/UL (ref 1–5.1)
LYMPHOCYTES NFR BLD: 2.3 %
LYMPHOCYTES NFR BLD: 2.8 %
MCH RBC QN AUTO: 24.8 PG (ref 26–34)
MCH RBC QN AUTO: 24.9 PG (ref 26–34)
MCHC RBC AUTO-ENTMCNC: 31.6 G/DL (ref 31–36)
MCHC RBC AUTO-ENTMCNC: 31.7 G/DL (ref 31–36)
MCV RBC AUTO: 78.2 FL (ref 80–100)
MCV RBC AUTO: 78.9 FL (ref 80–100)
MONOCYTES # BLD: 0.6 K/UL (ref 0–1.3)
MONOCYTES # BLD: 0.8 K/UL (ref 0–1.3)
MONOCYTES NFR BLD: 2.8 %
MONOCYTES NFR BLD: 3.3 %
NEUTROPHILS # BLD: 21.1 K/UL (ref 1.7–7.7)
NEUTROPHILS # BLD: 22.2 K/UL (ref 1.7–7.7)
NEUTROPHILS NFR BLD: 94.2 %
NEUTROPHILS NFR BLD: 94.3 %
NITRITE UR QL STRIP.AUTO: NEGATIVE
PH UR STRIP.AUTO: 5.5 [PH] (ref 5–8)
PLATELET # BLD AUTO: 269 K/UL (ref 135–450)
PLATELET # BLD AUTO: 285 K/UL (ref 135–450)
PMV BLD AUTO: 7.8 FL (ref 5–10.5)
PMV BLD AUTO: 7.8 FL (ref 5–10.5)
PROT UR STRIP.AUTO-MCNC: NEGATIVE MG/DL
RBC # BLD AUTO: 3.71 M/UL (ref 4.2–5.9)
RBC # BLD AUTO: 3.78 M/UL (ref 4.2–5.9)
SP GR UR STRIP.AUTO: 1.02 (ref 1–1.03)
UA COMPLETE W REFLEX CULTURE PNL UR: NORMAL
UA DIPSTICK W REFLEX MICRO PNL UR: NORMAL
URN SPEC COLLECT METH UR: NORMAL
UROBILINOGEN UR STRIP-ACNC: 1 E.U./DL
WBC # BLD AUTO: 22.4 K/UL (ref 4–11)
WBC # BLD AUTO: 23.6 K/UL (ref 4–11)

## 2023-07-26 PROCEDURE — 6370000000 HC RX 637 (ALT 250 FOR IP): Performed by: SURGERY

## 2023-07-26 PROCEDURE — 71260 CT THORAX DX C+: CPT

## 2023-07-26 PROCEDURE — 6360000002 HC RX W HCPCS: Performed by: SURGERY

## 2023-07-26 PROCEDURE — 6370000000 HC RX 637 (ALT 250 FOR IP): Performed by: INTERNAL MEDICINE

## 2023-07-26 PROCEDURE — 83605 ASSAY OF LACTIC ACID: CPT

## 2023-07-26 PROCEDURE — 99024 POSTOP FOLLOW-UP VISIT: CPT | Performed by: SURGERY

## 2023-07-26 PROCEDURE — 2580000003 HC RX 258: Performed by: SURGERY

## 2023-07-26 PROCEDURE — 51798 US URINE CAPACITY MEASURE: CPT

## 2023-07-26 PROCEDURE — 2700000000 HC OXYGEN THERAPY PER DAY

## 2023-07-26 PROCEDURE — 1200000000 HC SEMI PRIVATE

## 2023-07-26 PROCEDURE — 6360000002 HC RX W HCPCS: Performed by: INTERNAL MEDICINE

## 2023-07-26 PROCEDURE — 6360000004 HC RX CONTRAST MEDICATION: Performed by: NURSE PRACTITIONER

## 2023-07-26 PROCEDURE — 94669 MECHANICAL CHEST WALL OSCILL: CPT

## 2023-07-26 PROCEDURE — 81003 URINALYSIS AUTO W/O SCOPE: CPT

## 2023-07-26 PROCEDURE — 94640 AIRWAY INHALATION TREATMENT: CPT

## 2023-07-26 PROCEDURE — 94761 N-INVAS EAR/PLS OXIMETRY MLT: CPT

## 2023-07-26 PROCEDURE — 36415 COLL VENOUS BLD VENIPUNCTURE: CPT

## 2023-07-26 PROCEDURE — 87040 BLOOD CULTURE FOR BACTERIA: CPT

## 2023-07-26 PROCEDURE — 6360000002 HC RX W HCPCS: Performed by: NURSE PRACTITIONER

## 2023-07-26 PROCEDURE — 85025 COMPLETE CBC W/AUTO DIFF WBC: CPT

## 2023-07-26 PROCEDURE — APPSS30 APP SPLIT SHARED TIME 16-30 MINUTES: Performed by: CLINICAL NURSE SPECIALIST

## 2023-07-26 RX ORDER — FUROSEMIDE 10 MG/ML
40 INJECTION INTRAMUSCULAR; INTRAVENOUS ONCE
Status: COMPLETED | OUTPATIENT
Start: 2023-07-26 | End: 2023-07-26

## 2023-07-26 RX ORDER — ENOXAPARIN SODIUM 100 MG/ML
30 INJECTION SUBCUTANEOUS DAILY
Status: DISCONTINUED | OUTPATIENT
Start: 2023-07-26 | End: 2023-08-01 | Stop reason: HOSPADM

## 2023-07-26 RX ADMIN — HYDROMORPHONE HYDROCHLORIDE 0.5 MG: 1 INJECTION, SOLUTION INTRAMUSCULAR; INTRAVENOUS; SUBCUTANEOUS at 06:11

## 2023-07-26 RX ADMIN — IOPAMIDOL 75 ML: 755 INJECTION, SOLUTION INTRAVENOUS at 20:00

## 2023-07-26 RX ADMIN — Medication 2 PUFF: at 20:45

## 2023-07-26 RX ADMIN — TAMSULOSIN HYDROCHLORIDE 0.4 MG: 0.4 CAPSULE ORAL at 17:51

## 2023-07-26 RX ADMIN — HYDROMORPHONE HYDROCHLORIDE 0.5 MG: 1 INJECTION, SOLUTION INTRAMUSCULAR; INTRAVENOUS; SUBCUTANEOUS at 23:41

## 2023-07-26 RX ADMIN — ALBUTEROL SULFATE 2.5 MG: 2.5 SOLUTION RESPIRATORY (INHALATION) at 20:45

## 2023-07-26 RX ADMIN — FLUTICASONE PROPIONATE 2 SPRAY: 50 SPRAY, METERED NASAL at 09:17

## 2023-07-26 RX ADMIN — ALBUTEROL SULFATE 2.5 MG: 2.5 SOLUTION RESPIRATORY (INHALATION) at 12:05

## 2023-07-26 RX ADMIN — SODIUM CHLORIDE: 9 INJECTION, SOLUTION INTRAVENOUS at 13:25

## 2023-07-26 RX ADMIN — TIOTROPIUM BROMIDE INHALATION SPRAY 2 PUFF: 3.12 SPRAY, METERED RESPIRATORY (INHALATION) at 08:17

## 2023-07-26 RX ADMIN — ENOXAPARIN SODIUM 30 MG: 100 INJECTION SUBCUTANEOUS at 13:27

## 2023-07-26 RX ADMIN — Medication 2 PUFF: at 08:20

## 2023-07-26 RX ADMIN — ALBUTEROL SULFATE 2.5 MG: 2.5 SOLUTION RESPIRATORY (INHALATION) at 08:15

## 2023-07-26 RX ADMIN — FUROSEMIDE 40 MG: 10 INJECTION, SOLUTION INTRAMUSCULAR; INTRAVENOUS at 03:24

## 2023-07-26 RX ADMIN — ALBUTEROL SULFATE 2.5 MG: 2.5 SOLUTION RESPIRATORY (INHALATION) at 15:50

## 2023-07-26 ASSESSMENT — PAIN SCALES - GENERAL
PAINLEVEL_OUTOF10: 5
PAINLEVEL_OUTOF10: 8
PAINLEVEL_OUTOF10: 7
PAINLEVEL_OUTOF10: 6

## 2023-07-26 ASSESSMENT — PAIN DESCRIPTION - DESCRIPTORS
DESCRIPTORS: SHARP
DESCRIPTORS: ACHING;DISCOMFORT

## 2023-07-26 ASSESSMENT — PAIN DESCRIPTION - LOCATION
LOCATION: CHEST;BACK;RIB CAGE
LOCATION: BACK
LOCATION: ABDOMEN

## 2023-07-26 ASSESSMENT — PAIN DESCRIPTION - PAIN TYPE: TYPE: ACUTE PAIN

## 2023-07-26 ASSESSMENT — PAIN - FUNCTIONAL ASSESSMENT: PAIN_FUNCTIONAL_ASSESSMENT: ACTIVITIES ARE NOT PREVENTED

## 2023-07-26 ASSESSMENT — PAIN DESCRIPTION - ONSET: ONSET: PROGRESSIVE

## 2023-07-26 ASSESSMENT — PAIN DESCRIPTION - ORIENTATION: ORIENTATION: ANTERIOR;MID

## 2023-07-26 NOTE — OP NOTE
[address], [place] [address]                                OPERATIVE REPORT    PATIENT NAME: Bret Gtz                     :        1950  MED REC NO:   [id number]                          ROOM:       8520  ACCOUNT NO:   [de-identified]                           ADMIT DATE: 2023  PROVIDER:     Timothy Tidwell MD    DATE OF PROCEDURE:  2023    PREOPERATIVE DIAGNOSES:  Esophageal cancer and dysphasia. POSTOPERATIVE DIAGNOSES:  Esophageal cancer and dysphasia. PROCEDURE:  Port-A-Cath placement with surgeon's use of fluoroscopy and  jejunostomy tube insertion. ANESTHESIA:  General.    SURGEON:  Timothy Tidwell MD    ESTIMATED BLOOD LOSS:  Minimal.    INDICATIONS:  The patient is a 28-year-old gentleman who was recently  diagnosed with esophageal cancer. He has a near obstructing tumor and  has dysphagia. He needs long-term IV access and feeding access. OPERATIVE SUMMARY:  After preoperative evaluation, the patient was  brought into the operating suite and placed in a comfortable supine  position on the operating room table. Monitoring equipment was attached  and general anesthesia was induced. He was placed in Trendelenburg and  his neck and shoulders were sterilely prepped and draped. His left  subclavian area was anesthetized with local anesthetic and the  subclavian vein was easily accessed. A wire was passed and fluoro  showed this to be in good position. A small skin incision was made  around the wire and a subcutaneous pocket was created inferior to the  incision. The sheath and dilator were passed over the wire under  fluoroscopic guidance, and the wire and dilator were removed. The  catheter was passed through the sheath, and the sheath was peeled away. The tip of the catheter was positioned at the atriocaval junction and  cut at the skin level.   It was attached to the port using the

## 2023-07-26 NOTE — DISCHARGE INSTR - COC
Continuity of Care Form    Patient Name: Max Fowler   :  1950  MRN:  [id number]    Admit date:  2023  Discharge date:  ***    Code Status Order: Full Code   Advance Directives:     Admitting Physician:  Claude Palomares MD  PCP: Rolan Hernandez MD    Discharging Nurse: [facility] Unit/Room#: 0231/9712-69  Discharging Unit Phone Number: ***    Emergency Contact:   Extended Emergency Contact Information  Primary Emergency Contact: Sita Palacios  Address: Meenakshi Diaz, [address] [place] [address] of [address] [address] Phone: [phone]  Mobile Phone: [phone]  Relation: Spouse  Secondary Emergency Contact: Olaf Palacios  Mobile Phone: [phone]  Relation: Child  Preferred language: English   needed?  No    Past Surgical History:  Past Surgical History:   Procedure Laterality Date    BRONCHOSCOPY N/A 2023    ENDOBRONCHIAL ULTRASOUND WITH PATHOLOGY performed by Brent Dias MD at [facility]  2023    BRONCHOSCOPY/TRANSBRONCHIAL LUNG BIOPSY performed by Brent Dias MD at [address] [place] [address]  2023    BRONCHOSCOPY/TRANSBRONCHIAL LUNG BIOPSY ADDL LOBE performed by Brent Dias MD at [address] [place] [address] Bilateral     COLONOSCOPY      EYE SURGERY      clean up cataracts with laser surgery    GASTROSTOMY TUBE PLACEMENT N/A 2023    JEJUNOSTOMY TUBE PLACEMENT performed by Claude Palomares MD at [address]  2023    EGD BIOPSY    PARATHYROIDECTOMY Bilateral 2019    PORT SURGERY Left 2023    SURGICAL PORT PLACEMENT performed by Claude Palomares MD at [address] [facility] Right     [address] [facility]      hydocell removed from Lt testicle    UPPER GASTROINTESTINAL ENDOSCOPY N/A 2023    EGD BIOPSY performed by Mk Hennessy MD at [facility]       Immunization History:   Immunization History   Administered

## 2023-07-27 ENCOUNTER — APPOINTMENT (OUTPATIENT)
Dept: CT IMAGING | Age: 73
DRG: 356 | End: 2023-07-27
Attending: SURGERY
Payer: MEDICARE

## 2023-07-27 LAB
ALBUMIN SERPL-MCNC: 2.8 G/DL (ref 3.4–5)
ALBUMIN/GLOB SERPL: 0.8 {RATIO} (ref 1.1–2.2)
ALP SERPL-CCNC: 98 U/L (ref 40–129)
ALT SERPL-CCNC: 10 U/L (ref 10–40)
ANION GAP SERPL CALCULATED.3IONS-SCNC: 6 MMOL/L (ref 3–16)
AST SERPL-CCNC: 19 U/L (ref 15–37)
BASOPHILS # BLD: 0 K/UL (ref 0–0.2)
BASOPHILS NFR BLD: 0.1 %
BILIRUB SERPL-MCNC: 0.4 MG/DL (ref 0–1)
BUN SERPL-MCNC: 14 MG/DL (ref 7–20)
CALCIUM SERPL-MCNC: 11 MG/DL (ref 8.3–10.6)
CHLORIDE SERPL-SCNC: 95 MMOL/L (ref 99–110)
CO2 SERPL-SCNC: 35 MMOL/L (ref 21–32)
CREAT SERPL-MCNC: 0.9 MG/DL (ref 0.8–1.3)
DEPRECATED RDW RBC AUTO: 17.4 % (ref 12.4–15.4)
EOSINOPHIL # BLD: 0 K/UL (ref 0–0.6)
EOSINOPHIL NFR BLD: 0 %
FERRITIN SERPL IA-MCNC: 661.5 NG/ML (ref 30–400)
GFR SERPLBLD CREATININE-BSD FMLA CKD-EPI: >60 ML/MIN/{1.73_M2}
GLUCOSE SERPL-MCNC: 118 MG/DL (ref 70–99)
HCT VFR BLD AUTO: 29 % (ref 40.5–52.5)
HGB BLD-MCNC: 9.2 G/DL (ref 13.5–17.5)
IRON SATN MFR SERPL: 20 % (ref 20–50)
IRON SERPL-MCNC: 28 UG/DL (ref 59–158)
LYMPHOCYTES # BLD: 0.7 K/UL (ref 1–5.1)
LYMPHOCYTES NFR BLD: 3.1 %
MAGNESIUM SERPL-MCNC: 2.5 MG/DL (ref 1.8–2.4)
MCH RBC QN AUTO: 25.1 PG (ref 26–34)
MCHC RBC AUTO-ENTMCNC: 31.6 G/DL (ref 31–36)
MCV RBC AUTO: 79.4 FL (ref 80–100)
MONOCYTES # BLD: 0.7 K/UL (ref 0–1.3)
MONOCYTES NFR BLD: 3.4 %
NEUTROPHILS # BLD: 20 K/UL (ref 1.7–7.7)
NEUTROPHILS NFR BLD: 93.4 %
NT-PROBNP SERPL-MCNC: 786 PG/ML (ref 0–449)
PHOSPHATE SERPL-MCNC: 2.5 MG/DL (ref 2.5–4.9)
PLATELET # BLD AUTO: 262 K/UL (ref 135–450)
PMV BLD AUTO: 8.4 FL (ref 5–10.5)
POTASSIUM SERPL-SCNC: 3.5 MMOL/L (ref 3.5–5.1)
PROT SERPL-MCNC: 6.5 G/DL (ref 6.4–8.2)
RBC # BLD AUTO: 3.65 M/UL (ref 4.2–5.9)
SODIUM SERPL-SCNC: 136 MMOL/L (ref 136–145)
TIBC SERPL-MCNC: 140 UG/DL (ref 260–445)
WBC # BLD AUTO: 21.4 K/UL (ref 4–11)

## 2023-07-27 PROCEDURE — 2700000000 HC OXYGEN THERAPY PER DAY

## 2023-07-27 PROCEDURE — 2580000003 HC RX 258: Performed by: SURGERY

## 2023-07-27 PROCEDURE — 80053 COMPREHEN METABOLIC PANEL: CPT

## 2023-07-27 PROCEDURE — 97162 PT EVAL MOD COMPLEX 30 MIN: CPT

## 2023-07-27 PROCEDURE — 83550 IRON BINDING TEST: CPT

## 2023-07-27 PROCEDURE — 92526 ORAL FUNCTION THERAPY: CPT

## 2023-07-27 PROCEDURE — 6360000002 HC RX W HCPCS: Performed by: INTERNAL MEDICINE

## 2023-07-27 PROCEDURE — 6360000002 HC RX W HCPCS: Performed by: SURGERY

## 2023-07-27 PROCEDURE — 97530 THERAPEUTIC ACTIVITIES: CPT

## 2023-07-27 PROCEDURE — 97116 GAIT TRAINING THERAPY: CPT

## 2023-07-27 PROCEDURE — 99024 POSTOP FOLLOW-UP VISIT: CPT | Performed by: SURGERY

## 2023-07-27 PROCEDURE — 85025 COMPLETE CBC W/AUTO DIFF WBC: CPT

## 2023-07-27 PROCEDURE — 83735 ASSAY OF MAGNESIUM: CPT

## 2023-07-27 PROCEDURE — 92610 EVALUATE SWALLOWING FUNCTION: CPT

## 2023-07-27 PROCEDURE — 94669 MECHANICAL CHEST WALL OSCILL: CPT

## 2023-07-27 PROCEDURE — 97166 OT EVAL MOD COMPLEX 45 MIN: CPT

## 2023-07-27 PROCEDURE — 83540 ASSAY OF IRON: CPT

## 2023-07-27 PROCEDURE — 83880 ASSAY OF NATRIURETIC PEPTIDE: CPT

## 2023-07-27 PROCEDURE — 99222 1ST HOSP IP/OBS MODERATE 55: CPT | Performed by: INTERNAL MEDICINE

## 2023-07-27 PROCEDURE — 1200000000 HC SEMI PRIVATE

## 2023-07-27 PROCEDURE — 97535 SELF CARE MNGMENT TRAINING: CPT

## 2023-07-27 PROCEDURE — 94761 N-INVAS EAR/PLS OXIMETRY MLT: CPT

## 2023-07-27 PROCEDURE — 6360000002 HC RX W HCPCS: Performed by: NURSE PRACTITIONER

## 2023-07-27 PROCEDURE — 82728 ASSAY OF FERRITIN: CPT

## 2023-07-27 PROCEDURE — 94640 AIRWAY INHALATION TREATMENT: CPT

## 2023-07-27 PROCEDURE — 72128 CT CHEST SPINE W/O DYE: CPT

## 2023-07-27 PROCEDURE — 84100 ASSAY OF PHOSPHORUS: CPT

## 2023-07-27 RX ADMIN — ALBUTEROL SULFATE 2.5 MG: 2.5 SOLUTION RESPIRATORY (INHALATION) at 20:18

## 2023-07-27 RX ADMIN — ALBUTEROL SULFATE 2.5 MG: 2.5 SOLUTION RESPIRATORY (INHALATION) at 07:44

## 2023-07-27 RX ADMIN — Medication 2 PUFF: at 07:44

## 2023-07-27 RX ADMIN — HYDROMORPHONE HYDROCHLORIDE 0.5 MG: 1 INJECTION, SOLUTION INTRAMUSCULAR; INTRAVENOUS; SUBCUTANEOUS at 05:58

## 2023-07-27 RX ADMIN — HYDROMORPHONE HYDROCHLORIDE 0.5 MG: 1 INJECTION, SOLUTION INTRAMUSCULAR; INTRAVENOUS; SUBCUTANEOUS at 17:26

## 2023-07-27 RX ADMIN — ENOXAPARIN SODIUM 30 MG: 100 INJECTION SUBCUTANEOUS at 09:07

## 2023-07-27 RX ADMIN — TIOTROPIUM BROMIDE INHALATION SPRAY 2 PUFF: 3.12 SPRAY, METERED RESPIRATORY (INHALATION) at 07:44

## 2023-07-27 RX ADMIN — SODIUM CHLORIDE: 9 INJECTION, SOLUTION INTRAVENOUS at 15:08

## 2023-07-27 RX ADMIN — ALBUTEROL SULFATE 2.5 MG: 2.5 SOLUTION RESPIRATORY (INHALATION) at 15:50

## 2023-07-27 ASSESSMENT — PAIN SCALES - GENERAL
PAINLEVEL_OUTOF10: 4
PAINLEVEL_OUTOF10: 5
PAINLEVEL_OUTOF10: 1
PAINLEVEL_OUTOF10: 9

## 2023-07-27 ASSESSMENT — PAIN DESCRIPTION - DESCRIPTORS
DESCRIPTORS: ACHING;DISCOMFORT
DESCRIPTORS: ACHING
DESCRIPTORS: SHARP

## 2023-07-27 ASSESSMENT — ENCOUNTER SYMPTOMS
SHORTNESS OF BREATH: 1
GASTROINTESTINAL NEGATIVE: 1
EYES NEGATIVE: 1
ALLERGIC/IMMUNOLOGIC NEGATIVE: 1

## 2023-07-27 ASSESSMENT — PAIN DESCRIPTION - LOCATION
LOCATION: CHEST;RIB CAGE;BACK
LOCATION: HIP
LOCATION: ABDOMEN

## 2023-07-27 ASSESSMENT — PAIN DESCRIPTION - ORIENTATION
ORIENTATION: RIGHT
ORIENTATION: MID
ORIENTATION: LOWER

## 2023-07-27 ASSESSMENT — PAIN DESCRIPTION - ONSET: ONSET: ON-GOING

## 2023-07-28 ENCOUNTER — APPOINTMENT (OUTPATIENT)
Dept: GENERAL RADIOLOGY | Age: 73
DRG: 356 | End: 2023-07-28
Attending: SURGERY
Payer: MEDICARE

## 2023-07-28 LAB
ANION GAP SERPL CALCULATED.3IONS-SCNC: 6 MMOL/L (ref 3–16)
BASOPHILS # BLD: 0.1 K/UL (ref 0–0.2)
BASOPHILS NFR BLD: 0.3 %
BUN SERPL-MCNC: 12 MG/DL (ref 7–20)
CALCIUM SERPL-MCNC: 10.5 MG/DL (ref 8.3–10.6)
CHLORIDE SERPL-SCNC: 98 MMOL/L (ref 99–110)
CO2 SERPL-SCNC: 34 MMOL/L (ref 21–32)
CREAT SERPL-MCNC: 0.7 MG/DL (ref 0.8–1.3)
DEPRECATED RDW RBC AUTO: 17.4 % (ref 12.4–15.4)
EOSINOPHIL # BLD: 0 K/UL (ref 0–0.6)
EOSINOPHIL NFR BLD: 0.2 %
GFR SERPLBLD CREATININE-BSD FMLA CKD-EPI: >60 ML/MIN/{1.73_M2}
GLUCOSE SERPL-MCNC: 120 MG/DL (ref 70–99)
HCT VFR BLD AUTO: 27.9 % (ref 40.5–52.5)
HGB BLD-MCNC: 8.7 G/DL (ref 13.5–17.5)
LYMPHOCYTES # BLD: 0.7 K/UL (ref 1–5.1)
LYMPHOCYTES NFR BLD: 3.1 %
MAGNESIUM SERPL-MCNC: 2.3 MG/DL (ref 1.8–2.4)
MCH RBC QN AUTO: 24.7 PG (ref 26–34)
MCHC RBC AUTO-ENTMCNC: 31.2 G/DL (ref 31–36)
MCV RBC AUTO: 79.3 FL (ref 80–100)
MONOCYTES # BLD: 0.9 K/UL (ref 0–1.3)
MONOCYTES NFR BLD: 4.3 %
NEUTROPHILS # BLD: 19.6 K/UL (ref 1.7–7.7)
NEUTROPHILS NFR BLD: 92.1 %
PLATELET # BLD AUTO: 239 K/UL (ref 135–450)
PMV BLD AUTO: 8 FL (ref 5–10.5)
POTASSIUM SERPL-SCNC: 3.7 MMOL/L (ref 3.5–5.1)
RBC # BLD AUTO: 3.51 M/UL (ref 4.2–5.9)
SODIUM SERPL-SCNC: 138 MMOL/L (ref 136–145)
WBC # BLD AUTO: 21.3 K/UL (ref 4–11)

## 2023-07-28 PROCEDURE — 6370000000 HC RX 637 (ALT 250 FOR IP): Performed by: SURGERY

## 2023-07-28 PROCEDURE — 74018 RADEX ABDOMEN 1 VIEW: CPT

## 2023-07-28 PROCEDURE — 94640 AIRWAY INHALATION TREATMENT: CPT

## 2023-07-28 PROCEDURE — 94669 MECHANICAL CHEST WALL OSCILL: CPT

## 2023-07-28 PROCEDURE — 99232 SBSQ HOSP IP/OBS MODERATE 35: CPT | Performed by: INTERNAL MEDICINE

## 2023-07-28 PROCEDURE — 6360000002 HC RX W HCPCS: Performed by: SURGERY

## 2023-07-28 PROCEDURE — 2700000000 HC OXYGEN THERAPY PER DAY

## 2023-07-28 PROCEDURE — 74230 X-RAY XM SWLNG FUNCJ C+: CPT

## 2023-07-28 PROCEDURE — 94761 N-INVAS EAR/PLS OXIMETRY MLT: CPT

## 2023-07-28 PROCEDURE — 83735 ASSAY OF MAGNESIUM: CPT

## 2023-07-28 PROCEDURE — 6360000002 HC RX W HCPCS: Performed by: NURSE PRACTITIONER

## 2023-07-28 PROCEDURE — 85025 COMPLETE CBC W/AUTO DIFF WBC: CPT

## 2023-07-28 PROCEDURE — 6360000002 HC RX W HCPCS: Performed by: INTERNAL MEDICINE

## 2023-07-28 PROCEDURE — 80048 BASIC METABOLIC PNL TOTAL CA: CPT

## 2023-07-28 PROCEDURE — 2580000003 HC RX 258: Performed by: SURGERY

## 2023-07-28 PROCEDURE — 1200000000 HC SEMI PRIVATE

## 2023-07-28 PROCEDURE — 92611 MOTION FLUOROSCOPY/SWALLOW: CPT

## 2023-07-28 PROCEDURE — 92526 ORAL FUNCTION THERAPY: CPT

## 2023-07-28 PROCEDURE — 6370000000 HC RX 637 (ALT 250 FOR IP): Performed by: NURSE PRACTITIONER

## 2023-07-28 PROCEDURE — 99024 POSTOP FOLLOW-UP VISIT: CPT | Performed by: SURGERY

## 2023-07-28 PROCEDURE — 6370000000 HC RX 637 (ALT 250 FOR IP): Performed by: INTERNAL MEDICINE

## 2023-07-28 RX ORDER — BISACODYL 10 MG
10 SUPPOSITORY, RECTAL RECTAL DAILY PRN
Status: DISCONTINUED | OUTPATIENT
Start: 2023-07-28 | End: 2023-08-01 | Stop reason: HOSPADM

## 2023-07-28 RX ADMIN — HYDROMORPHONE HYDROCHLORIDE 0.5 MG: 1 INJECTION, SOLUTION INTRAMUSCULAR; INTRAVENOUS; SUBCUTANEOUS at 04:37

## 2023-07-28 RX ADMIN — TAMSULOSIN HYDROCHLORIDE 0.4 MG: 0.4 CAPSULE ORAL at 18:22

## 2023-07-28 RX ADMIN — ENOXAPARIN SODIUM 30 MG: 100 INJECTION SUBCUTANEOUS at 08:38

## 2023-07-28 RX ADMIN — TIOTROPIUM BROMIDE AND OLODATEROL 2 PUFF: 3.124; 2.736 SPRAY, METERED RESPIRATORY (INHALATION) at 08:28

## 2023-07-28 RX ADMIN — SODIUM CHLORIDE: 9 INJECTION, SOLUTION INTRAVENOUS at 04:35

## 2023-07-28 RX ADMIN — ALBUTEROL SULFATE 2.5 MG: 2.5 SOLUTION RESPIRATORY (INHALATION) at 21:00

## 2023-07-28 RX ADMIN — FLUTICASONE PROPIONATE 2 SPRAY: 50 SPRAY, METERED NASAL at 08:38

## 2023-07-28 RX ADMIN — SODIUM CHLORIDE: 9 INJECTION, SOLUTION INTRAVENOUS at 18:16

## 2023-07-28 RX ADMIN — ALBUTEROL SULFATE 2.5 MG: 2.5 SOLUTION RESPIRATORY (INHALATION) at 08:28

## 2023-07-28 RX ADMIN — HYDROMORPHONE HYDROCHLORIDE 0.5 MG: 1 INJECTION, SOLUTION INTRAMUSCULAR; INTRAVENOUS; SUBCUTANEOUS at 15:48

## 2023-07-28 RX ADMIN — HYDROMORPHONE HYDROCHLORIDE 0.5 MG: 1 INJECTION, SOLUTION INTRAMUSCULAR; INTRAVENOUS; SUBCUTANEOUS at 21:48

## 2023-07-28 RX ADMIN — ALBUTEROL SULFATE 2.5 MG: 2.5 SOLUTION RESPIRATORY (INHALATION) at 16:05

## 2023-07-28 RX ADMIN — DOCUSATE SODIUM LIQUID 100 MG: 100 LIQUID ORAL at 14:43

## 2023-07-28 ASSESSMENT — PAIN DESCRIPTION - ONSET: ONSET: PROGRESSIVE

## 2023-07-28 ASSESSMENT — PAIN DESCRIPTION - DESCRIPTORS
DESCRIPTORS: ACHING;CRAMPING;DISCOMFORT
DESCRIPTORS: ACHING;DISCOMFORT

## 2023-07-28 ASSESSMENT — PAIN SCALES - GENERAL
PAINLEVEL_OUTOF10: 5
PAINLEVEL_OUTOF10: 4
PAINLEVEL_OUTOF10: 6
PAINLEVEL_OUTOF10: 8
PAINLEVEL_OUTOF10: 8

## 2023-07-28 ASSESSMENT — PAIN DESCRIPTION - LOCATION
LOCATION: ABDOMEN
LOCATION: BACK;RIB CAGE;HIP

## 2023-07-28 NOTE — PROCEDURES
COLONOSCOPY      EYE SURGERY      clean up cataracts with laser surgery    GASTROSTOMY TUBE PLACEMENT N/A 7/25/2023    JEJUNOSTOMY TUBE PLACEMENT performed by Sriram Dorado MD at [address]  07/13/2023    EGD BIOPSY    PARATHYROIDECTOMY Bilateral 2019    PORT SURGERY Left 7/25/2023    SURGICAL PORT PLACEMENT performed by Sriram Dorado MD at [address] [facility] Right 2009    [address] [facility]  2008    hydocell removed from Lt testicle    UPPER GASTROINTESTINAL ENDOSCOPY N/A 07/13/2023    EGD BIOPSY performed by Moe Jacques MD at [address]     No Known Allergies    Current Diet Solid Consistency: NPO  Current Diet Liquid Consistency: NPO    Date of Prior Study: No prior MBSS    Recent CXR/CT of Chest:     Patient Complaints/Reason for Referral:  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 Shani Pedraza  Referring MD: EMILIA Villalpando - CNP    Assessment: Pt presents with [facility] oral and pharyngeal phase of the swallow.  Pt seated upright and readily accepted all trials via SLP set

## 2023-07-29 PROBLEM — J96.01 ACUTE RESPIRATORY FAILURE WITH HYPOXIA (HCC): Status: ACTIVE | Noted: 2023-01-01

## 2023-07-29 PROBLEM — J44.9 CHRONIC OBSTRUCTIVE PULMONARY DISEASE (HCC): Status: ACTIVE | Noted: 2023-07-29

## 2023-07-29 PROCEDURE — 2700000000 HC OXYGEN THERAPY PER DAY

## 2023-07-29 PROCEDURE — 6370000000 HC RX 637 (ALT 250 FOR IP): Performed by: NURSE PRACTITIONER

## 2023-07-29 PROCEDURE — 94761 N-INVAS EAR/PLS OXIMETRY MLT: CPT

## 2023-07-29 PROCEDURE — 94669 MECHANICAL CHEST WALL OSCILL: CPT

## 2023-07-29 PROCEDURE — 6360000002 HC RX W HCPCS: Performed by: SURGERY

## 2023-07-29 PROCEDURE — 1200000000 HC SEMI PRIVATE

## 2023-07-29 PROCEDURE — 99232 SBSQ HOSP IP/OBS MODERATE 35: CPT | Performed by: INTERNAL MEDICINE

## 2023-07-29 PROCEDURE — 94640 AIRWAY INHALATION TREATMENT: CPT

## 2023-07-29 PROCEDURE — 6360000002 HC RX W HCPCS: Performed by: INTERNAL MEDICINE

## 2023-07-29 PROCEDURE — 6360000002 HC RX W HCPCS: Performed by: NURSE PRACTITIONER

## 2023-07-29 PROCEDURE — 99024 POSTOP FOLLOW-UP VISIT: CPT | Performed by: SURGERY

## 2023-07-29 PROCEDURE — 6370000000 HC RX 637 (ALT 250 FOR IP): Performed by: SURGERY

## 2023-07-29 RX ADMIN — FLUTICASONE PROPIONATE 2 SPRAY: 50 SPRAY, METERED NASAL at 09:29

## 2023-07-29 RX ADMIN — ALBUTEROL SULFATE 2.5 MG: 2.5 SOLUTION RESPIRATORY (INHALATION) at 19:50

## 2023-07-29 RX ADMIN — HYDROMORPHONE HYDROCHLORIDE 0.5 MG: 1 INJECTION, SOLUTION INTRAMUSCULAR; INTRAVENOUS; SUBCUTANEOUS at 12:38

## 2023-07-29 RX ADMIN — ALBUTEROL SULFATE 2.5 MG: 2.5 SOLUTION RESPIRATORY (INHALATION) at 08:12

## 2023-07-29 RX ADMIN — ENOXAPARIN SODIUM 30 MG: 100 INJECTION SUBCUTANEOUS at 09:28

## 2023-07-29 RX ADMIN — HYDROMORPHONE HYDROCHLORIDE 0.5 MG: 1 INJECTION, SOLUTION INTRAMUSCULAR; INTRAVENOUS; SUBCUTANEOUS at 03:45

## 2023-07-29 RX ADMIN — ALBUTEROL SULFATE 2.5 MG: 2.5 SOLUTION RESPIRATORY (INHALATION) at 16:07

## 2023-07-29 RX ADMIN — HYDROMORPHONE HYDROCHLORIDE 0.5 MG: 1 INJECTION, SOLUTION INTRAMUSCULAR; INTRAVENOUS; SUBCUTANEOUS at 09:28

## 2023-07-29 RX ADMIN — DOCUSATE SODIUM LIQUID 100 MG: 100 LIQUID ORAL at 20:44

## 2023-07-29 RX ADMIN — TIOTROPIUM BROMIDE AND OLODATEROL 2 PUFF: 3.124; 2.736 SPRAY, METERED RESPIRATORY (INHALATION) at 08:12

## 2023-07-29 RX ADMIN — DOCUSATE SODIUM LIQUID 100 MG: 100 LIQUID ORAL at 09:29

## 2023-07-29 RX ADMIN — ALBUTEROL SULFATE 2.5 MG: 2.5 SOLUTION RESPIRATORY (INHALATION) at 11:51

## 2023-07-29 RX ADMIN — HYDROMORPHONE HYDROCHLORIDE 0.5 MG: 1 INJECTION, SOLUTION INTRAMUSCULAR; INTRAVENOUS; SUBCUTANEOUS at 20:52

## 2023-07-29 ASSESSMENT — PAIN DESCRIPTION - LOCATION
LOCATION: BACK
LOCATION: GENERALIZED;BACK
LOCATION: BACK

## 2023-07-29 ASSESSMENT — PAIN SCALES - GENERAL
PAINLEVEL_OUTOF10: 7
PAINLEVEL_OUTOF10: 9
PAINLEVEL_OUTOF10: 7

## 2023-07-29 ASSESSMENT — PAIN DESCRIPTION - DESCRIPTORS: DESCRIPTORS: ACHING;CRAMPING

## 2023-07-30 ENCOUNTER — APPOINTMENT (OUTPATIENT)
Dept: GENERAL RADIOLOGY | Age: 73
DRG: 356 | End: 2023-07-30
Attending: SURGERY
Payer: MEDICARE

## 2023-07-30 LAB
ANION GAP SERPL CALCULATED.3IONS-SCNC: 7 MMOL/L (ref 3–16)
BACTERIA BLD CULT ORG #2: NORMAL
BACTERIA BLD CULT: NORMAL
BUN SERPL-MCNC: 13 MG/DL (ref 7–20)
CALCIUM SERPL-MCNC: 11.1 MG/DL (ref 8.3–10.6)
CHLORIDE SERPL-SCNC: 100 MMOL/L (ref 99–110)
CO2 SERPL-SCNC: 32 MMOL/L (ref 21–32)
CREAT SERPL-MCNC: 0.7 MG/DL (ref 0.8–1.3)
DEPRECATED RDW RBC AUTO: 17.8 % (ref 12.4–15.4)
GFR SERPLBLD CREATININE-BSD FMLA CKD-EPI: >60 ML/MIN/{1.73_M2}
GLUCOSE SERPL-MCNC: 96 MG/DL (ref 70–99)
HCT VFR BLD AUTO: 28.8 % (ref 40.5–52.5)
HGB BLD-MCNC: 9.1 G/DL (ref 13.5–17.5)
MCH RBC QN AUTO: 24.9 PG (ref 26–34)
MCHC RBC AUTO-ENTMCNC: 31.6 G/DL (ref 31–36)
MCV RBC AUTO: 78.9 FL (ref 80–100)
PLATELET # BLD AUTO: 276 K/UL (ref 135–450)
PMV BLD AUTO: 7.7 FL (ref 5–10.5)
POTASSIUM SERPL-SCNC: 3.9 MMOL/L (ref 3.5–5.1)
RBC # BLD AUTO: 3.65 M/UL (ref 4.2–5.9)
SODIUM SERPL-SCNC: 139 MMOL/L (ref 136–145)
WBC # BLD AUTO: 21.5 K/UL (ref 4–11)

## 2023-07-30 PROCEDURE — 74018 RADEX ABDOMEN 1 VIEW: CPT

## 2023-07-30 PROCEDURE — 94761 N-INVAS EAR/PLS OXIMETRY MLT: CPT

## 2023-07-30 PROCEDURE — 2700000000 HC OXYGEN THERAPY PER DAY

## 2023-07-30 PROCEDURE — 6360000002 HC RX W HCPCS: Performed by: INTERNAL MEDICINE

## 2023-07-30 PROCEDURE — 1200000000 HC SEMI PRIVATE

## 2023-07-30 PROCEDURE — 94640 AIRWAY INHALATION TREATMENT: CPT

## 2023-07-30 PROCEDURE — 80048 BASIC METABOLIC PNL TOTAL CA: CPT

## 2023-07-30 PROCEDURE — 6360000002 HC RX W HCPCS: Performed by: SURGERY

## 2023-07-30 PROCEDURE — 94669 MECHANICAL CHEST WALL OSCILL: CPT

## 2023-07-30 PROCEDURE — 99232 SBSQ HOSP IP/OBS MODERATE 35: CPT | Performed by: INTERNAL MEDICINE

## 2023-07-30 PROCEDURE — 6370000000 HC RX 637 (ALT 250 FOR IP): Performed by: NURSE PRACTITIONER

## 2023-07-30 PROCEDURE — 85027 COMPLETE CBC AUTOMATED: CPT

## 2023-07-30 RX ORDER — MORPHINE SULFATE 20 MG/ML
2.5 SOLUTION ORAL EVERY 4 HOURS PRN
Status: DISCONTINUED | OUTPATIENT
Start: 2023-07-30 | End: 2023-08-01 | Stop reason: HOSPADM

## 2023-07-30 RX ORDER — MORPHINE SULFATE 20 MG/ML
2.5 SOLUTION ORAL EVERY 4 HOURS PRN
Status: DISCONTINUED | OUTPATIENT
Start: 2023-07-30 | End: 2023-07-30

## 2023-07-30 RX ADMIN — Medication 2.5 MG: at 20:02

## 2023-07-30 RX ADMIN — HYDROMORPHONE HYDROCHLORIDE 0.5 MG: 1 INJECTION, SOLUTION INTRAMUSCULAR; INTRAVENOUS; SUBCUTANEOUS at 01:09

## 2023-07-30 RX ADMIN — HYDROMORPHONE HYDROCHLORIDE 0.5 MG: 1 INJECTION, SOLUTION INTRAMUSCULAR; INTRAVENOUS; SUBCUTANEOUS at 06:38

## 2023-07-30 RX ADMIN — HYDROMORPHONE HYDROCHLORIDE 0.5 MG: 1 INJECTION, SOLUTION INTRAMUSCULAR; INTRAVENOUS; SUBCUTANEOUS at 09:51

## 2023-07-30 RX ADMIN — ALBUTEROL SULFATE 2.5 MG: 2.5 SOLUTION RESPIRATORY (INHALATION) at 15:38

## 2023-07-30 RX ADMIN — ALBUTEROL SULFATE 2.5 MG: 2.5 SOLUTION RESPIRATORY (INHALATION) at 20:09

## 2023-07-30 RX ADMIN — ALBUTEROL SULFATE 2.5 MG: 2.5 SOLUTION RESPIRATORY (INHALATION) at 11:25

## 2023-07-30 RX ADMIN — FLUTICASONE PROPIONATE 2 SPRAY: 50 SPRAY, METERED NASAL at 09:51

## 2023-07-30 RX ADMIN — Medication 2.5 MG: at 15:12

## 2023-07-30 ASSESSMENT — PAIN SCALES - GENERAL
PAINLEVEL_OUTOF10: 7
PAINLEVEL_OUTOF10: 6
PAINLEVEL_OUTOF10: 7
PAINLEVEL_OUTOF10: 7
PAINLEVEL_OUTOF10: 6
PAINLEVEL_OUTOF10: 7

## 2023-07-30 ASSESSMENT — PAIN DESCRIPTION - ORIENTATION
ORIENTATION: LEFT;RIGHT
ORIENTATION: MID

## 2023-07-30 ASSESSMENT — PAIN DESCRIPTION - LOCATION
LOCATION: BACK
LOCATION: BACK;HIP
LOCATION: BACK

## 2023-07-30 ASSESSMENT — PAIN DESCRIPTION - PAIN TYPE: TYPE: ACUTE PAIN

## 2023-07-30 ASSESSMENT — PAIN DESCRIPTION - DESCRIPTORS
DESCRIPTORS: ACHING
DESCRIPTORS: ACHING

## 2023-07-30 ASSESSMENT — PAIN - FUNCTIONAL ASSESSMENT
PAIN_FUNCTIONAL_ASSESSMENT: PREVENTS OR INTERFERES WITH MANY ACTIVE NOT PASSIVE ACTIVITIES
PAIN_FUNCTIONAL_ASSESSMENT: ACTIVITIES ARE NOT PREVENTED

## 2023-07-31 LAB
ANION GAP SERPL CALCULATED.3IONS-SCNC: 8 MMOL/L (ref 3–16)
BUN SERPL-MCNC: 14 MG/DL (ref 7–20)
CALCIUM SERPL-MCNC: 11.5 MG/DL (ref 8.3–10.6)
CHLORIDE SERPL-SCNC: 100 MMOL/L (ref 99–110)
CO2 SERPL-SCNC: 32 MMOL/L (ref 21–32)
CREAT SERPL-MCNC: 0.8 MG/DL (ref 0.8–1.3)
DEPRECATED RDW RBC AUTO: 17.4 % (ref 12.4–15.4)
GFR SERPLBLD CREATININE-BSD FMLA CKD-EPI: >60 ML/MIN/{1.73_M2}
GLUCOSE SERPL-MCNC: 106 MG/DL (ref 70–99)
HCT VFR BLD AUTO: 27.3 % (ref 40.5–52.5)
HGB BLD-MCNC: 8.8 G/DL (ref 13.5–17.5)
MCH RBC QN AUTO: 25.1 PG (ref 26–34)
MCHC RBC AUTO-ENTMCNC: 32.1 G/DL (ref 31–36)
MCV RBC AUTO: 78.2 FL (ref 80–100)
PLATELET # BLD AUTO: 263 K/UL (ref 135–450)
PMV BLD AUTO: 7.9 FL (ref 5–10.5)
POTASSIUM SERPL-SCNC: 3.7 MMOL/L (ref 3.5–5.1)
RBC # BLD AUTO: 3.5 M/UL (ref 4.2–5.9)
REASON FOR REJECTION: NORMAL
REJECTED TEST: NORMAL
SODIUM SERPL-SCNC: 140 MMOL/L (ref 136–145)
WBC # BLD AUTO: 21.6 K/UL (ref 4–11)

## 2023-07-31 PROCEDURE — 97110 THERAPEUTIC EXERCISES: CPT

## 2023-07-31 PROCEDURE — 85027 COMPLETE CBC AUTOMATED: CPT

## 2023-07-31 PROCEDURE — 94640 AIRWAY INHALATION TREATMENT: CPT

## 2023-07-31 PROCEDURE — 99024 POSTOP FOLLOW-UP VISIT: CPT | Performed by: SURGERY

## 2023-07-31 PROCEDURE — 92526 ORAL FUNCTION THERAPY: CPT

## 2023-07-31 PROCEDURE — 97116 GAIT TRAINING THERAPY: CPT

## 2023-07-31 PROCEDURE — 2700000000 HC OXYGEN THERAPY PER DAY

## 2023-07-31 PROCEDURE — 94669 MECHANICAL CHEST WALL OSCILL: CPT

## 2023-07-31 PROCEDURE — 97530 THERAPEUTIC ACTIVITIES: CPT

## 2023-07-31 PROCEDURE — 6360000002 HC RX W HCPCS: Performed by: INTERNAL MEDICINE

## 2023-07-31 PROCEDURE — 99232 SBSQ HOSP IP/OBS MODERATE 35: CPT | Performed by: STUDENT IN AN ORGANIZED HEALTH CARE EDUCATION/TRAINING PROGRAM

## 2023-07-31 PROCEDURE — 80048 BASIC METABOLIC PNL TOTAL CA: CPT

## 2023-07-31 PROCEDURE — 94761 N-INVAS EAR/PLS OXIMETRY MLT: CPT

## 2023-07-31 PROCEDURE — 6370000000 HC RX 637 (ALT 250 FOR IP): Performed by: SURGERY

## 2023-07-31 PROCEDURE — 6360000002 HC RX W HCPCS: Performed by: NURSE PRACTITIONER

## 2023-07-31 PROCEDURE — 1200000000 HC SEMI PRIVATE

## 2023-07-31 PROCEDURE — 6370000000 HC RX 637 (ALT 250 FOR IP): Performed by: NURSE PRACTITIONER

## 2023-07-31 RX ORDER — BISACODYL 10 MG
10 SUPPOSITORY, RECTAL RECTAL DAILY PRN
Qty: 4 SUPPOSITORY | Refills: 0 | Status: SHIPPED | OUTPATIENT
Start: 2023-07-31 | End: 2023-08-30

## 2023-07-31 RX ORDER — BISACODYL 10 MG
10 SUPPOSITORY, RECTAL RECTAL ONCE
Status: COMPLETED | OUTPATIENT
Start: 2023-07-31 | End: 2023-07-31

## 2023-07-31 RX ORDER — MORPHINE SULFATE 20 MG/ML
2.5 SOLUTION ORAL EVERY 4 HOURS PRN
Qty: 5 ML | Refills: 0 | Status: SHIPPED | OUTPATIENT
Start: 2023-07-31 | End: 2023-08-07

## 2023-07-31 RX ADMIN — ALBUTEROL SULFATE 2.5 MG: 2.5 SOLUTION RESPIRATORY (INHALATION) at 08:19

## 2023-07-31 RX ADMIN — TIOTROPIUM BROMIDE AND OLODATEROL 2 PUFF: 3.124; 2.736 SPRAY, METERED RESPIRATORY (INHALATION) at 08:19

## 2023-07-31 RX ADMIN — ALBUTEROL SULFATE 2.5 MG: 2.5 SOLUTION RESPIRATORY (INHALATION) at 19:42

## 2023-07-31 RX ADMIN — ENOXAPARIN SODIUM 30 MG: 100 INJECTION SUBCUTANEOUS at 09:20

## 2023-07-31 RX ADMIN — BISACODYL 10 MG: 10 SUPPOSITORY RECTAL at 13:37

## 2023-07-31 RX ADMIN — Medication 2.5 MG: at 06:58

## 2023-07-31 RX ADMIN — FLUTICASONE PROPIONATE 2 SPRAY: 50 SPRAY, METERED NASAL at 09:19

## 2023-07-31 RX ADMIN — Medication 2.5 MG: at 16:45

## 2023-07-31 ASSESSMENT — PAIN SCALES - GENERAL
PAINLEVEL_OUTOF10: 0
PAINLEVEL_OUTOF10: 6
PAINLEVEL_OUTOF10: 6

## 2023-07-31 ASSESSMENT — PAIN DESCRIPTION - DESCRIPTORS
DESCRIPTORS: ACHING
DESCRIPTORS: DISCOMFORT;ACHING

## 2023-07-31 ASSESSMENT — PAIN DESCRIPTION - LOCATION
LOCATION: BACK
LOCATION: BACK;ABDOMEN

## 2023-08-01 VITALS
SYSTOLIC BLOOD PRESSURE: 132 MMHG | HEART RATE: 87 BPM | TEMPERATURE: 97.9 F | WEIGHT: 141 LBS | DIASTOLIC BLOOD PRESSURE: 74 MMHG | HEIGHT: 68 IN | OXYGEN SATURATION: 97 % | RESPIRATION RATE: 18 BRPM | BODY MASS INDEX: 21.37 KG/M2

## 2023-08-01 LAB
ANION GAP SERPL CALCULATED.3IONS-SCNC: 7 MMOL/L (ref 3–16)
BUN SERPL-MCNC: 17 MG/DL (ref 7–20)
CALCIUM SERPL-MCNC: 11.7 MG/DL (ref 8.3–10.6)
CHLORIDE SERPL-SCNC: 105 MMOL/L (ref 99–110)
CO2 SERPL-SCNC: 34 MMOL/L (ref 21–32)
CREAT SERPL-MCNC: 0.8 MG/DL (ref 0.8–1.3)
DEPRECATED RDW RBC AUTO: 18 % (ref 12.4–15.4)
GFR SERPLBLD CREATININE-BSD FMLA CKD-EPI: >60 ML/MIN/{1.73_M2}
GLUCOSE SERPL-MCNC: 160 MG/DL (ref 70–99)
HCT VFR BLD AUTO: 28.4 % (ref 40.5–52.5)
HGB BLD-MCNC: 9.1 G/DL (ref 13.5–17.5)
MCH RBC QN AUTO: 25 PG (ref 26–34)
MCHC RBC AUTO-ENTMCNC: 31.9 G/DL (ref 31–36)
MCV RBC AUTO: 78.3 FL (ref 80–100)
PLATELET # BLD AUTO: 269 K/UL (ref 135–450)
PMV BLD AUTO: 7.8 FL (ref 5–10.5)
POTASSIUM SERPL-SCNC: 3.6 MMOL/L (ref 3.5–5.1)
RBC # BLD AUTO: 3.62 M/UL (ref 4.2–5.9)
SODIUM SERPL-SCNC: 146 MMOL/L (ref 136–145)
WBC # BLD AUTO: 24.9 K/UL (ref 4–11)

## 2023-08-01 PROCEDURE — 94640 AIRWAY INHALATION TREATMENT: CPT

## 2023-08-01 PROCEDURE — 6370000000 HC RX 637 (ALT 250 FOR IP): Performed by: NURSE PRACTITIONER

## 2023-08-01 PROCEDURE — 85027 COMPLETE CBC AUTOMATED: CPT

## 2023-08-01 PROCEDURE — 2700000000 HC OXYGEN THERAPY PER DAY

## 2023-08-01 PROCEDURE — 6360000002 HC RX W HCPCS: Performed by: NURSE PRACTITIONER

## 2023-08-01 PROCEDURE — 2580000003 HC RX 258: Performed by: INTERNAL MEDICINE

## 2023-08-01 PROCEDURE — 94761 N-INVAS EAR/PLS OXIMETRY MLT: CPT

## 2023-08-01 PROCEDURE — 80048 BASIC METABOLIC PNL TOTAL CA: CPT

## 2023-08-01 PROCEDURE — 6360000002 HC RX W HCPCS: Performed by: INTERNAL MEDICINE

## 2023-08-01 RX ORDER — ALBUTEROL SULFATE 2.5 MG/3ML
2.5 SOLUTION RESPIRATORY (INHALATION) EVERY 4 HOURS PRN
Status: DISCONTINUED | OUTPATIENT
Start: 2023-08-01 | End: 2023-08-01 | Stop reason: HOSPADM

## 2023-08-01 RX ADMIN — FLUTICASONE PROPIONATE 2 SPRAY: 50 SPRAY, METERED NASAL at 10:28

## 2023-08-01 RX ADMIN — PAMIDRONATE DISODIUM 90 MG: 9 INJECTION, SOLUTION INTRAVENOUS at 10:27

## 2023-08-01 RX ADMIN — TIOTROPIUM BROMIDE AND OLODATEROL 2 PUFF: 3.124; 2.736 SPRAY, METERED RESPIRATORY (INHALATION) at 08:38

## 2023-08-01 RX ADMIN — Medication 2.5 MG: at 11:27

## 2023-08-01 RX ADMIN — ENOXAPARIN SODIUM 30 MG: 100 INJECTION SUBCUTANEOUS at 10:33

## 2023-08-01 RX ADMIN — Medication 2.5 MG: at 00:56

## 2023-08-01 ASSESSMENT — PAIN SCALES - GENERAL
PAINLEVEL_OUTOF10: 8
PAINLEVEL_OUTOF10: 4

## 2023-08-01 ASSESSMENT — PAIN DESCRIPTION - ORIENTATION: ORIENTATION: LOWER

## 2023-08-01 ASSESSMENT — PAIN DESCRIPTION - LOCATION
LOCATION: BACK
LOCATION: BACK

## 2023-08-01 ASSESSMENT — PAIN DESCRIPTION - DESCRIPTORS: DESCRIPTORS: ACHING

## 2023-08-01 NOTE — PLAN OF CARE
PT eval complete. Increase function to baseline.
Problem: Discharge Planning  Goal: Discharge to home or other facility with appropriate resources  7/28/2023 2316 by Sofiya Serna RN  Outcome: Progressing     Problem: Pain  Goal: Verbalizes/displays adequate comfort level or baseline comfort level  7/29/2023 1144 by Marley Crandall RN  Flowsheets (Taken 7/29/2023 1144)  Verbalizes/displays adequate comfort level or baseline comfort level:   Encourage patient to monitor pain and request assistance   Assess pain using appropriate pain scale   Administer analgesics based on type and severity of pain and evaluate response  7/28/2023 2316 by Sofiya Serna RN  Outcome: Progressing     Problem: Safety - Adult  Goal: Free from fall injury  Outcome: Progressing  Flowsheets (Taken 7/29/2023 1144)  Free From Fall Injury: Instruct family/caregiver on patient safety
Problem: Discharge Planning  Goal: Discharge to home or other facility with appropriate resources  7/28/2023 2316 by Tristan Park RN  Outcome: Progressing  7/28/2023 1846 by Barbra Saab RN  Outcome: Progressing  Flowsheets (Taken 7/28/2023 1846)  Discharge to home or other facility with appropriate resources:   Identify barriers to discharge with patient and caregiver   Arrange for needed discharge resources and transportation as appropriate   Identify discharge learning needs (meds, wound care, etc)     Problem: Pain  Goal: Verbalizes/displays adequate comfort level or baseline comfort level  7/28/2023 2316 by Tristan Park RN  Outcome: Progressing  7/28/2023 1846 by Barbra Saab RN  Outcome: Progressing  Flowsheets (Taken 7/28/2023 1846)  Verbalizes/displays adequate comfort level or baseline comfort level:   Encourage patient to monitor pain and request assistance   Assess pain using appropriate pain scale   Administer analgesics based on type and severity of pain and evaluate response  7/28/2023 1157 by Bereket Baires RN  Outcome: Progressing  Flowsheets (Taken 7/28/2023 1157)  Verbalizes/displays adequate comfort level or baseline comfort level:   Encourage patient to monitor pain and request assistance   Assess pain using appropriate pain scale   Administer analgesics based on type and severity of pain and evaluate response   Implement non-pharmacological measures as appropriate and evaluate response   Consider cultural and social influences on pain and pain management
Problem: Discharge Planning  Goal: Discharge to home or other facility with appropriate resources  7/30/2023 0519 by Santy Washington  Outcome: Progressing  Flowsheets (Taken 7/28/2023 1846 by Dwayne Patel RN)  Discharge to home or other facility with appropriate resources:   Identify barriers to discharge with patient and caregiver   Arrange for needed discharge resources and transportation as appropriate   Identify discharge learning needs (meds, wound care, etc)     Problem: Pain  Goal: Verbalizes/displays adequate comfort level or baseline comfort level  7/30/2023 1037 by Carlos Sweet RN  Flowsheets (Taken 7/30/2023 1037)  Verbalizes/displays adequate comfort level or baseline comfort level:   Encourage patient to monitor pain and request assistance   Administer analgesics based on type and severity of pain and evaluate response   Assess pain using appropriate pain scale  7/30/2023 0519 by Santy Washington  Outcome: Progressing  Flowsheets (Taken 7/29/2023 1144 by Carlos Sweet RN)  Verbalizes/displays adequate comfort level or baseline comfort level:   Encourage patient to monitor pain and request assistance   Assess pain using appropriate pain scale   Administer analgesics based on type and severity of pain and evaluate response     Problem: Safety - Adult  Goal: Free from fall injury  Outcome: Progressing  Flowsheets (Taken 7/30/2023 1037)  Free From Fall Injury: Instruct family/caregiver on patient safety
Problem: Discharge Planning  Goal: Discharge to home or other facility with appropriate resources  8/1/2023 0231 by Tierra Woods RN  Outcome: Progressing  7/31/2023 1741 by Lora Mcqueen RN  Outcome: Progressing  Flowsheets (Taken 7/31/2023 1741)  Discharge to home or other facility with appropriate resources: Identify barriers to discharge with patient and caregiver     Problem: Pain  Goal: Verbalizes/displays adequate comfort level or baseline comfort level  8/1/2023 0231 by Tierra Woods RN  Outcome: Progressing  7/31/2023 1741 by Lora Mcqueen RN  Outcome: Progressing  Flowsheets (Taken 7/30/2023 1037 by Sulema Ernandez RN)  Verbalizes/displays adequate comfort level or baseline comfort level:   Encourage patient to monitor pain and request assistance   Administer analgesics based on type and severity of pain and evaluate response   Assess pain using appropriate pain scale     Problem: Safety - Adult  Goal: Free from fall injury  8/1/2023 0231 by Tierra Woods RN  Outcome: Progressing  7/31/2023 1741 by Lora Mcqueen RN  Outcome: Progressing  Flowsheets (Taken 7/30/2023 1037 by Sulema Ernandez RN)  Free From Fall Injury: Instruct family/caregiver on patient safety     Problem: Skin/Tissue Integrity  Goal: Absence of new skin breakdown  Description: 1. Monitor for areas of redness and/or skin breakdown  2. Assess vascular access sites hourly  3. Every 4-6 hours minimum:  Change oxygen saturation probe site  4. Every 4-6 hours:  If on nasal continuous positive airway pressure, respiratory therapy assess nares and determine need for appliance change or resting period.   Outcome: Progressing     Problem: ABCDS Injury Assessment  Goal: Absence of physical injury  Outcome: Progressing     Problem: Nutrition Deficit:  Goal: Optimize nutritional status  Outcome: Progressing
Problem: Discharge Planning  Goal: Discharge to home or other facility with appropriate resources  Outcome: Progressing  Flowsheets (Taken 7/27/2023 3093)  Discharge to home or other facility with appropriate resources:   Identify barriers to discharge with patient and caregiver   Arrange for needed discharge resources and transportation as appropriate   Identify discharge learning needs (meds, wound care, etc)   Arrange for interpreters to assist at discharge as needed   Refer to discharge planning if patient needs post-hospital services based on physician order or complex needs related to functional status, cognitive ability or social support system     Problem: Pain  Goal: Verbalizes/displays adequate comfort level or baseline comfort level  Outcome: Progressing  Flowsheets (Taken 7/27/2023 0613)  Verbalizes/displays adequate comfort level or baseline comfort level:   Encourage patient to monitor pain and request assistance   Assess pain using appropriate pain scale   Administer analgesics based on type and severity of pain and evaluate response   Implement non-pharmacological measures as appropriate and evaluate response   Consider cultural and social influences on pain and pain management   Notify Licensed Independent Practitioner if interventions unsuccessful or patient reports new pain     Problem: Safety - Adult  Goal: Free from fall injury  Outcome: Progressing  Flowsheets (Taken 7/27/2023 0613)  Free From Fall Injury:   Instruct family/caregiver on patient safety   Based on caregiver fall risk screen, instruct family/caregiver to ask for assistance with transferring infant if caregiver noted to have fall risk factors     Problem: Skin/Tissue Integrity  Goal: Absence of new skin breakdown  Description: 1. Monitor for areas of redness and/or skin breakdown  2. Assess vascular access sites hourly  3. Every 4-6 hours minimum:  Change oxygen saturation probe site  4.   Every 4-6 hours:  If on nasal
Problem: Discharge Planning  Goal: Discharge to home or other facility with appropriate resources  Outcome: Progressing  Flowsheets (Taken 7/28/2023 1846 by Merritt Alejandro RN)  Discharge to home or other facility with appropriate resources:   Identify barriers to discharge with patient and caregiver   Arrange for needed discharge resources and transportation as appropriate   Identify discharge learning needs (meds, wound care, etc)     Problem: Pain  Goal: Verbalizes/displays adequate comfort level or baseline comfort level  Outcome: Progressing  Flowsheets (Taken 7/29/2023 1144 by Hudson Calloway RN)  Verbalizes/displays adequate comfort level or baseline comfort level:   Encourage patient to monitor pain and request assistance   Assess pain using appropriate pain scale   Administer analgesics based on type and severity of pain and evaluate response
Problem: Discharge Planning  Goal: Discharge to home or other facility with appropriate resources  Outcome: Progressing  Flowsheets (Taken 7/28/2023 1846)  Discharge to home or other facility with appropriate resources:   Identify barriers to discharge with patient and caregiver   Arrange for needed discharge resources and transportation as appropriate   Identify discharge learning needs (meds, wound care, etc)     Problem: Pain  Goal: Verbalizes/displays adequate comfort level or baseline comfort level  7/28/2023 1846 by Laila Lopes RN  Outcome: Progressing  Flowsheets (Taken 7/28/2023 1846)  Verbalizes/displays adequate comfort level or baseline comfort level:   Encourage patient to monitor pain and request assistance   Assess pain using appropriate pain scale   Administer analgesics based on type and severity of pain and evaluate response  7/28/2023 1157 by Kelly Alex RN  Outcome: Progressing  Flowsheets (Taken 7/28/2023 1157)  Verbalizes/displays adequate comfort level or baseline comfort level:   Encourage patient to monitor pain and request assistance   Assess pain using appropriate pain scale   Administer analgesics based on type and severity of pain and evaluate response   Implement non-pharmacological measures as appropriate and evaluate response   Consider cultural and social influences on pain and pain management     Problem: Safety - Adult  Goal: Free from fall injury  7/28/2023 1846 by Laila Lopes RN  Outcome: Progressing  Flowsheets (Taken 7/28/2023 1846)  Free From Fall Injury:   Instruct family/caregiver on patient safety   Based on caregiver fall risk screen, instruct family/caregiver to ask for assistance with transferring infant if caregiver noted to have fall risk factors  7/28/2023 1157 by Kelly Alex RN  Outcome: Progressing  Flowsheets (Taken 7/28/2023 1157)  Free From Fall Injury: Instruct family/caregiver on patient safety     Problem: Skin/Tissue Integrity  Goal: Absence of new
Problem: Discharge Planning  Goal: Discharge to home or other facility with appropriate resources  Outcome: Progressing  Flowsheets (Taken 7/31/2023 1741)  Discharge to home or other facility with appropriate resources: Identify barriers to discharge with patient and caregiver     Problem: Pain  Goal: Verbalizes/displays adequate comfort level or baseline comfort level  Outcome: Progressing  Flowsheets (Taken 7/30/2023 1037 by Parth Wise RN)  Verbalizes/displays adequate comfort level or baseline comfort level:   Encourage patient to monitor pain and request assistance   Administer analgesics based on type and severity of pain and evaluate response   Assess pain using appropriate pain scale     Problem: Safety - Adult  Goal: Free from fall injury  Outcome: Progressing  Flowsheets (Taken 7/30/2023 1037 by Parth Wise RN)  Free From Fall Injury: Instruct family/caregiver on patient safety
Problem: Nutrition Deficit:  Goal: Optimize nutritional status  7/27/2023 1437 by Christy Jaffe RN  Outcome: Progressing  Flowsheets (Taken 7/27/2023 1595 by Jayden Lewis RN)  Nutrient intake appropriate for improving, restoring, or maintaining nutritional needs:   Assess nutritional status and recommend course of action   Monitor oral intake, labs, and treatment plans   Recommend, monitor, and adjust tube feedings and TPN/PPN based on assessed needs
Problem: Safety - Adult  Goal: Free from fall injury  Outcome: Progressing  Flowsheets (Taken 7/26/2023 0132)  Free From Fall Injury:   Instruct family/caregiver on patient safety   Based on caregiver fall risk screen, instruct family/caregiver to ask for assistance with transferring infant if caregiver noted to have fall risk factors
SLP Evaluation Completed.  All note are found in EMR    Alissa JEFFERS, CF-SLP
nares and determine need for appliance change or resting period.   Outcome: Progressing     Problem: ABCDS Injury Assessment  Goal: Absence of physical injury  Outcome: Progressing     Problem: Nutrition Deficit:  Goal: Optimize nutritional status  7/27/2023 4299 by Jayden Lewis RN  Outcome: Progressing  Flowsheets (Taken 7/27/2023 0309 by Jayden Lewis, RN)  Nutrient intake appropriate for improving, restoring, or maintaining nutritional needs:   Assess nutritional status and recommend course of action   Monitor oral intake, labs, and treatment plans   Recommend, monitor, and adjust tube feedings and TPN/PPN based on assessed needs

## 2023-08-01 NOTE — CONSULTS
Comprehensive Nutrition Assessment    Type and Reason for Visit:  Initial, Consult, Positive Nutrition Screen    Nutrition Recommendations/Plan:   CLD per MD  Continue Jevity 1.5 (standards with fiber formula) at 20 mL/hr and as tolerated, increase by 10 mL/hr q 6 hours until goal of 60 mL/hr. Recommend 60 mL H20 q 4 hours. Recommend reevaluate IV fluids at this time. Increase flush if Na+ increases greater than 145 mEq/L. Monitor closely and correct lytes (K, Phos, Mg) before progressing TF to goal d/t risk of refeeding syndrome (hx extended inadequate nutritional intake). Monitor for tolerance (bowel habits, N/V, cramping, abdominal exam findings: distended, firm, tense, guarded, discomfort). Obtain updated weight  Monitor nutrition adequacy, pertinent labs, bowel habits, wt changes, and clinical progress     Malnutrition Assessment:  Malnutrition Status: At risk for malnutrition (Comment) (07/26/23 8669)    Context:  Chronic Illness     Findings of the 6 clinical characteristics of malnutrition:  Energy Intake:  75% or less estimated energy requirements for 1 month or longer    Nutrition Assessment:    Consult for TF recommendations only. +IP for weight loss/decreased appetite. 76 yo M w [facility] esophageal cancer s/p J-Tube placement 7/25. Currently on CLD, no po intakes per EMR. Current weight stated, RD to order updated weight. TF initiated yesterday, running at 20 ml/hr at time of visit. Pt sleeping, spoke w son. Son reports pt has had very little po intake the past few weeks d/t inability to swallow. Reports pt has lost 15 lbs in past three weeks and had a UBW of 200 lbs. Discussed pt tube feed and questions. Tube feed recommendations provided, pt at risk of refeeding syndrome d/t prolonged poor po intake. Will monitor. Nutrition Related Findings:    J-tube. Hypoactive BS. Na 142.  Wound Type: Surgical Incision       Current Nutrition Intake & Therapies:    Average Meal Intake: 0%  Average Supplements
Comprehensive Nutrition Assessment    Type and Reason for Visit:  Reassess    Nutrition Recommendations/Plan:   NPO per SLP  Continue Jevity 1.5 (standards with fiber formula) at goal of 60 mL/hr. Water flush per MD. Recommend 60 ml water flush q 4 hrs. Increase flush if Na+ increases greater than 145 mEq/L. Monitor for tolerance (bowel habits, N/V, cramping, abdominal exam findings: distended, firm, tense, guarded, discomfort). Obtain updated weight- ordered 7/26  Monitor nutrition adequacy, pertinent labs, bowel habits, wt changes, and clinical progress     Malnutrition Assessment:  Malnutrition Status: At risk for malnutrition (Comment) (07/26/23 5929)      Nutrition Assessment:    Follow up: Pt NPO per SLP eval. TF continues at goal of 60 ml/hr since 7/28 via J-tube. Weight remains stated per EMR. Consulted to discuss TF w pt wife. Pt reports tolerating TF and having one normal bowel movement. Denies any nausea, diarrhea, constipation or abdominal distention. Provided wife w tube feed guideline and problem solving handouts. Wife had questions regarding pt staying elevated and being able to disconnect feed for appointments. Recommended for pt to stay elevated and pausing feeds when necessary as long as pt is able to resume feeds and obtain adequate nutrition consistently. Plan for d/c today. Continue current recommendations. Will continue to monitor. Nutrition Related Findings:    J-tube. Na 146. BM 7/28, active BS. Wound Type: Surgical Incision       Current Nutrition Intake & Therapies:    Average Meal Intake: NPO  Average Supplements Intake: NPO  Diet NPO  ADULT TUBE FEEDING; Jejunostomy; Standard with Fiber; Continuous; 60; No; 50; Q 6 hours  Current Tube Feeding (TF) Orders:  Feeding Route: Jejunostomy  Formula: Standard with Fiber  Schedule: Continuous  Goal TF & Flush Orders Provides: Jevity 1.5 at goal rate of 60 ml/hr x20 hrs to provide 1200 ml TV, 1800 kcals, 77 g protein, 912 ml free water.
Consult Call Back    [id number]  Date:7/28/2023,  Time:1:49 PM    Electronically signed by Robert Martinez on 7/28/23 at 1:49 PM EDT
Consult Call Back    Who:Dr. Alanis Erps  Date:7/27/2023,  Time:10:42 AM    Electronically signed by Last Miranda on 7/27/23 at 10:42 AM EDT
Consult Note            Date:7/27/2023        Patient Den Dillon     Date of Birth:[birth year]     Age:73 y.o. Inpatient consult to Pulmonology  Consult performed by: Aurora Pretty MD  Consult ordered by: EMILIA Salgado CNP  Reason for consult: Consult for hypoxemia        Chief Complaint   No chief complaint on file. History Obtained From   patient, electronic medical record    History of Present Illness   This is a 78-year-old gentleman who came for an elective procedures of J-tube insertion and Port-A-Cath placement. 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 Patient is a former smoker who had more than 50-pack-year smoking history and quit 8 years ago. Patient has been having some shortness of breath. However postoperatively the patient was requiring more oxygen and was initially on 100% nonrebreather. Patient surgical team consulted hospitalist for hypoxemia and then pulmonary was consulted. Patient has been seen by pulmonary for any EBUS to look at the lung nodule and lymph nodes that were present on CT scan. Patient did not have shortness of breath addressed at the time.     Patient has a wrap around the belly for the J-tube placement  No chest pains or palpitations  Patient is unable to get a full deep breath    Past Medical History     Past Medical History:   Diagnosis Date    Asthma     as child    BPH (benign prostatic hyperplasia)     Cancer (HCC)     Esoph. cancer    Cellulitis of pubic region 02/13/2021    Cough     Hernia, umbilical     [place] (hard of hearing)     SLIGHT    Ileitis     diet controlled    Lung mass     Wears dentures         Past Surgical History     Past Surgical History:   Procedure Laterality Date    BRONCHOSCOPY N/A 07/11/2023    ENDOBRONCHIAL ULTRASOUND WITH PATHOLOGY performed by Rashid Agarwal MD at [facility]  07/11/2023    BRONCHOSCOPY/TRANSBRONCHIAL LUNG
Consult Placed     Who: Elena Ward  Date:7/25/23  LNES:[id number]     Electronically signed by Trevon Bojorquez on 7/25/2023 at 10:42 PM
Consult Placed   Consult sent via perfect serve  Who: Dr. Bustamante November  Date:7/27/2023    Time:08:31AM     Electronically signed by Lisha Pederson on 7/27/2023 at 8:31 AM
Consult Placed   Consult sent via perfect serve  Who: Jennifer Lombardi  Date:7/28/2023    OCAD:31:48GT     Electronically signed by Bertin Chavez on 7/28/2023 at 8:56 AM
Consult Placed via perfectserve to [employer] signed by Alex Jaramillo on 7/28/2023 at 3:54 PM
Consult Placed:  Consult sent via perfect serve  Who: ALDAIR  Hospitalist  Date:7/26/2023    Time:11:38AM     Electronically signed by Bertin Chavez on 7/26/2023 at 11:37 AM
GI NOTE:   Consult received for failed MBS. Chart reviewed. Patient with newly diagnosed esophageal cancer earlier this month. Near obstructing mass seen. S/p J tube placement on 7/25 with Dr. Asad Alamo. Unclear why patient had modified barium swallow. Patient with dysphagia due to esophageal mass. Lesion is near obstructing and patient should be NPO. J tube in place for enteral feeds. Plan:   Continue NPO. Okay for ice chips for comfort  Follow up oncology and palliative plan of care  No further recommendations or work up from GI standpoint. Plan was discussed with Constancia Angelucci, NP. Please call with questions or concerns. [phone]. Also available via perfect serve.
Hospital Medicine Consult History & Physical    Date of Service: Pt seen/examined in consultation on 7/25/2023 at the request of Kathya Benito MD for medical management     Reason for consult: Hypoxemia    Presenting Admission History:   75 y.o. male who presented to [facility] with for an elective surgery. He has history of stage IV esophageal CA with evidence of metastatic disease to lung, liver, peritoneum, former smoker. Patient underwent Port-A-Cath placement and J tube insertion today by general surgery. He arrived postop to the floor at around 10 PM today and was found to be hypoxemic requiring 100% nonrebreather. So hospitalist team was consulted for the hypoxemia. Patient has long smoking history of 1 to 2 packs/day for 50 years and quit smoking 8 years ago. Currently reports mild shortness of breath. No cough. Denies any chest pain. He reports he feels better after getting a bronchodilator nebulization treatment. Currently on nonrebreather 100% saturating at 99%. He has tube feeds going through his J-tube, is also on IV normal saline at 100 cc/hour. Assessment/Plan:    Postoperative hypoxemia  -Likely due to atelectasis, mild pulmonary edema from postoperative status. Has prolonged smoking history and may have COPD. CXR showed bibasilar opacities right >left which may be developing infiltrate. proBNP elevated 1267  -Continue O2 supplementation currently weaned down to HFNC 15 LPM, wean as tolerated keep sats >92%  -1 dose IV Lasix 40 Mg for elevated BNP and likelihood of early pulmonary edema  -Continue bronchodilator nebs every 4 hours  -Incentive spirometer, Acapella, deep breathing/coughing exercises  -No evidence of wheezing at this time, hold off steroids  -Low suspicion for acute PE.  D-dimer will be high due to risk cancer history. If hypoxia does not resolve may consider getting CTA chest to rule out acute PE in the a.m.     Hx of esophageal CA-stage IV with mets to lung,
PALLIATIVE MEDICINE CONSULTATION     Patient name:Mono Sifuentes   LDM:[id number]    :1950  Room/Bed:0350/0350-01   LOS: 3 days         Date of consult:2023    Consult Information  Palliative Medicine Consult performed by: EMILIA Galloway CNP, CNP    Inpatient consult to Palliative Care  Consult performed by: EMILIA Berry CNP  Consult ordered by: ROSANNA Nieves             ASSESSMENT/RECOMMENDATIONS     75 y.o. male with newly diagnosed stage IV esophageal cancer admitted with shortness of breath following J tube and port placement. Symptom Management:  Goals of Care- Discussion initiated. Patient tells me his goals are to be around as long as possible to be with his wife, kids and grand kids; however, as he has seen prior family members do in the past, he intends to stop treatment and enroll in hospice if he feels the treatment is causing him more discomfort than benefit. His plan is to go home with outpatient palliative chemo/radiation and \"take it day by day\". He has family that will help him through this. At this time he appears somewhat resistant to discussing [facility] or advance directives in further depth. He wants to remain a full code at this time. He is agreeable to home palliative care referral for ongoing support (referral placed). Shortness of breath - Likely multifactorial due to atelectasis, mild pulmonary edema from postoperative status, suspected COPD, metastatic disease and placement of the tight abdominal binder. CXR showed bibasilar opacities right > left which may be developing infiltrate. proBNP. CT PE negative for PE. Pulmonology following. Rivera Spicer removed to lessen restriction on breathing which he says helped. Has been weaned from NRB to HF to 4 L today. He states he would still want to be intubated if his respiratory status deteriorated. Stage IV esophageal cancer - Diagnosed via EBUS 23.   Mets to lung,
Office: [phone]; [address] [place] [address] Road: [id number]
incomplete bladder emptying    Mass of lung    Malignant neoplasm metastatic to bone [facility])    Malignant neoplasm metastatic to left lung (HCC)    Malignant neoplasm metastatic to liver [facility])    Malignant neoplasm metastatic to peritoneum [facility])    Malignant neoplasm of esophagus (HCC)    Shortness of breath    Esophageal cancer (HCC)       IMPRESSION/RECOMMENDATIONS:    Esophageal cancer  Bone metastasis  -Lower thoracic esophagus  -Stage IV  -T3, N3, M1  -Metastasis to lung, liver, and peritoneum  -CXR 06/28/2023 showed bilateral lung masses suspicious for neoplasm  -CT chest 06/28/2023 showed esophagus markedly thickened and irregular concerning for carcinoma and multiple lung nodules  - CT chest 07/26 shows rapidly progressive metastatic disease in the lungs   - lymph nodes 07/11 lung positive for squamous cell carcinoma, in station 7  -Had EGD on 07/13/2023 with biopsies confirming squamous cell carcinoma moderately to poorly diff with extensive ulceration and necrosis   - CEA 1.34 and unremarkable   - PET scan pending 07/21  - scheduled to receive palliative radiation and start palliative chemo outpatient at [facility]  - J-tube and port placed 07/25/2023  - Patient has already been made aware that the intent of his treatment is palliative in today. He still wants to pursue chemotherapy if he is able to tolerate it. He would like to trial at least one round if he is able to.   - consulted palliative care for code status and goals of care discussion  - O2 on 7L and saturation is 100%, could be weaned down further likely but will defer to pulm    Dysphagia  Abdominal Bloating   Respiratory Distress   -GI did not feel the patient can have a stent  -Despite having metastatic disease we will consider starting carboplatin/Taxol with concurrent radiation for control of pain and hopefully prevent long-term use of PEG  - PEG tube placed 07/26  - discussed with IM today. Agree with checking KUB, and barium swallow.     Bone

## 2023-08-01 NOTE — DISCHARGE SUMMARY
Hospital Medicine Discharge Summary    Patient: Rickey Padilla   : 1950     Admit Date: 2023   Discharge Date:   2023  Disposition:  [x]Home   [x]HHC  []SNF  []Acute Rehab  []LTAC  []Hospice  Code status:  [x]Full  []DNR/CCA  []Limited (DNR/CCA with Do Not Intubate)  []DNRCC  Condition at Discharge: Stable  Primary Care Provider: Jesus Shields MD    Admitting Provider: Alex Lagunas MD  Discharge Provider: EMILIA Thomas - CNP     Discharge Diagnoses: Active Hospital Problems    Diagnosis     Acute respiratory failure with hypoxia (HCC) [J96.01]     Chronic obstructive pulmonary disease (HCC) [J44.9]     Esophageal cancer ([address]) [C15.9]        Presenting Admission History: This is a 75 y.o. male who presented to [facility] with for an elective surgery. He has history of stage IV esophageal CA with evidence of metastatic disease to lung, liver, peritoneum, former smoker. Patient underwent Port-A-Cath placement and J tube insertion today by general surgery. He arrived postop to the floor at around 10 PM today and was found to be hypoxemic requiring 100% nonrebreather. So hospitalist team was consulted for the hypoxemia. Patient has long smoking history of 1 to 2 packs/day for 50 years and quit smoking 8 years ago. Assessment/Plan:      Postoperative acute respiratory failure wih hypoxemia  Concern for underlying obstructive disease due to prolonged smoking history   Likely multifactorial due to atelectasis, mild pulmonary edema from postoperative status. Has prolonged smoking history and may have COPD. CXR showed bibasilar opacities right > left which may be developing infiltrate. proBNP elevated 1267     Continue O2 supplementation currently weaned down to 2 liters, wean as tolerated keep sats 88-92%. As patient most likely has underlying COPD with smoking history.      Received 1 dose IV Lasix 40 Mg for elevated BNP and likelihood of early
+------------------------+----------+---------------+----------+ ! GSV Below Knee          ! Yes       ! Yes            ! None      ! +------------------------+----------+---------------+----------+ ! Gastroc                 ! Yes       ! Yes            ! None      ! +------------------------+----------+---------------+----------+ ! Soleal                  !Yes       ! Yes            ! None      ! +------------------------+----------+---------------+----------+ ! PTV                     ! Yes       ! Yes            ! None      ! +------------------------+----------+---------------+----------+ ! Peroneal                !Yes       ! Yes            ! None      ! +------------------------+----------+---------------+----------+ ! GSV Calf                ! Yes       ! Yes            ! None      ! +------------------------+----------+---------------+----------+ ! SSV                     ! Yes       ! Yes            ! None      ! +------------------------+----------+---------------+----------+ Left Doppler Measurements +------------------------+------+------+------------+ ! Location                ! Signal!Reflux! Reflux (sec)! +------------------------+------+------+------------+ ! Sapheno Femoral Junction! Phasic! No    !            ! +------------------------+------+------+------------+ ! Common Femoral          !Phasic! No    !            ! +------------------------+------+------+------------+ ! Femoral                 !Phasic! No    !            ! +------------------------+------+------+------------+ ! Deep Femoral            !Phasic! No    !            ! +------------------------+------+------+------------+ ! Popliteal               !Phasic! No    !            ! +------------------------+------+------+------------+    [facility] VASCULAR ACCESS GUIDANCE    Result Date: 7/25/2023  EXAMINATION: SPOT FLUOROSCOPIC IMAGES 7/25/2023 2:49 pm TECHNIQUE: Fluoroscopy was provided by the radiology department for procedure. Radiologist was not present during examination.

## 2023-08-02 ENCOUNTER — CARE COORDINATION (OUTPATIENT)
Dept: CASE MANAGEMENT | Age: 73
End: 2023-08-02

## 2023-08-02 DIAGNOSIS — C15.9 MALIGNANT NEOPLASM OF ESOPHAGUS, UNSPECIFIED LOCATION (HCC): Primary | ICD-10-CM

## 2023-08-02 PROCEDURE — 1111F DSCHRG MED/CURRENT MED MERGE: CPT | Performed by: INTERNAL MEDICINE

## 2023-08-02 NOTE — CARE COORDINATION
contact their home care provider to request a nurse visit even if it isn't their regularly scheduled day for their nurse to visit. Care Transition Nurse reviewed discharge instructions, medical action plan, and red flags with family who verbalized understanding. The family was given an opportunity to ask questions and does not have any further questions or concerns at this time. Were discharge instructions available to patient? Yes. Reviewed appropriate site of care based on symptoms and resources available to patient including: PCP  Specialist  Home health  When to call 911. The family agrees to contact the PCP office for questions related to their healthcare. Advance Care Planning:   Does patient have an Advance Directive: not on file; education provided and referral to internal ACP facilitator. Medication reconciliation was performed with family, who verbalizes understanding of administration of home medications. Medications reviewed, 1111F entered: yes    Was patient discharged with a pulse oximeter? no    Non-face-to-face services provided:  Obtained and reviewed discharge summary and/or continuity of care documents  Education of patient/family/caregiver/guardian to support self-management-. Offered patient enrollment in the Remote Patient Monitoring (RPM) program for in-home monitoring:  patient in process of finding a new PCP . Care Transitions 24 Hour Call    Schedule Follow Up Appointment with PCP: Declined  Do you have a copy of your discharge instructions?: Yes  Do you have all of your prescriptions and are they filled?: Yes  Have you been contacted by a [id number] Pharmacist?: No  Have you scheduled your follow up appointment?: No  Do you have support at home?: Partner/Spouse/SO  Do you feel like you have everything you need to keep you well at home?: Yes  Are you an active caregiver in your home?: No  Care Transitions Interventions         Discussed follow-up appointments.  If no

## 2023-08-03 ENCOUNTER — TELEPHONE (OUTPATIENT)
Dept: CARE COORDINATION | Age: 73
End: 2023-08-03

## 2023-08-07 ENCOUNTER — TELEPHONE (OUTPATIENT)
Dept: INTERNAL MEDICINE CLINIC | Age: 73
End: 2023-08-07

## 2023-08-07 NOTE — TELEPHONE ENCOUNTER
Per Dr. Blaise Riedel- received a call from local police on  regarding reported death of patient. Patient's chart will be marked as .

## 2023-08-09 ENCOUNTER — CARE COORDINATION (OUTPATIENT)
Dept: CASE MANAGEMENT | Age: 73
End: 2023-08-09

## 2023-08-09 NOTE — CARE COORDINATION
Per chart review PCP received call patient  23.      Carlos KINGN, RN, [facility]  Care Transition Nurse  [phone] mobile

## 2023-09-14 ENCOUNTER — TELEPHONE (OUTPATIENT)
Dept: INTERNAL MEDICINE CLINIC | Age: 73
End: 2023-09-14

## 2023-09-14 NOTE — TELEPHONE ENCOUNTER
Breanna Leos from [facility] given phone and fax # to send records request for this patient.

## (undated) DEVICE — CONMED SCOPE SAVER BITE BLOCK, 20X27 MM: Brand: SCOPE SAVER

## (undated) DEVICE — CATHETER,URETHRAL,REDRUBBER,STRL,18FR: Brand: MEDLINE

## (undated) DEVICE — GOWN SIRUS NONREIN LG W/TWL: Brand: MEDLINE INDUSTRIES, INC.

## (undated) DEVICE — SUTURE PROL SZ 1 L30IN NONABSORBABLE BLU L36MM CT-1 1/2 CIR 8425H

## (undated) DEVICE — Z INACTIVATING USE 2488003 GUIDEWIRE ENDO L150CM DIA0.035IN STR TIP (QTY = EACH)

## (undated) DEVICE — FORCEP BX STD CAP 240CM RAD JAW 4

## (undated) DEVICE — SINGLE USE SUCTION VALVE MAJ-209: Brand: SINGLE USE SUCTION VALVE (STERILE)

## (undated) DEVICE — LIQUIBAND RAPID ADHESIVE 36/CS 0.8ML: Brand: MEDLINE

## (undated) DEVICE — LINER,SOFT,SUCTION CANISTER,1500CC: Brand: MEDLINE

## (undated) DEVICE — SUTURE PERMAHAND SZ 2-0 L30IN NONABSORBABLE BLK SH L26MM C016D

## (undated) DEVICE — CANNULA,OXY,ADULT,SUPERSOFT,W/7'TUB,SC: Brand: MEDLINE INDUSTRIES, INC.

## (undated) DEVICE — SUTURE PROL 2-0 L48IN NONABSORBABLE BLU SH L26MM 1/2 CIR 8533H

## (undated) DEVICE — ELECTRODE,RADIOTRANSLUCENT,FOAM,3PK: Brand: MEDLINE

## (undated) DEVICE — BOWL MED M 16OZ PLAS CAP GRAD

## (undated) DEVICE — NEEDLE ASPIR 21GA L700MM US GUID TREAT DST END FOR EFFICIENT

## (undated) DEVICE — TUBING, SUCTION, 3/16" X 12', STRAIGHT: Brand: MEDLINE

## (undated) DEVICE — SHEET,DRAPE,40X58,STERILE: Brand: MEDLINE

## (undated) DEVICE — GLOVE,SURG,SENSICARE SLT,LF,PF,7.5: Brand: MEDLINE

## (undated) DEVICE — Device

## (undated) DEVICE — YANKAUER,BULB TIP,W/O VENT,RIGID,STERILE: Brand: MEDLINE

## (undated) DEVICE — Z INACTIVE NO ACTIVE SUPPLIER APPLICATOR MEDICATED 26 CC TINT HI-LITE ORNG STRL CHLORAPREP

## (undated) DEVICE — ENDOSCOPIC KIT 2 12 FT OP4 DE2 GWN SYR

## (undated) DEVICE — SYRINGE MED 50ML LUERSLIP TIP

## (undated) DEVICE — SUTURE PERMAHAND SZ 2-0 L18IN NONABSORBABLE BLK L26MM SH C012D

## (undated) DEVICE — SUTURE MCRYL + SZ 4-0 L18IN ABSRB UD L19MM PS-2 3/8 CIR MCP496G

## (undated) DEVICE — SYRINGE MED 10ML SLIP TIP BLNT FILL AND LUERLOCK DISP

## (undated) DEVICE — TUBING, SUCTION, 3/16" X 6', STRAIGHT: Brand: MEDLINE

## (undated) DEVICE — SINGLE USE BIOPSY VALVE MAJ-210: Brand: SINGLE USE BIOPSY VALVE (STERILE)

## (undated) DEVICE — PLUG,CATHETER,DRAINAGE PROTECTOR,TUBE: Brand: MEDLINE

## (undated) DEVICE — TRAP,MUCUS SPECIMEN, 80CC: Brand: MEDLINE

## (undated) DEVICE — SOLUTION IRRIG 1000ML 0.9% SOD CHL USP POUR PLAS BTL

## (undated) DEVICE — SYRINGE MED 10ML LUERLOCK TIP W/O SFTY DISP

## (undated) DEVICE — SUTURE PERMA-HAND SZ 2-0 L30IN NONABSORBABLE BLK L26MM SH K833H

## (undated) DEVICE — SUTURE VCRL + SZ 3-0 L18IN ABSRB UD SH 1/2 CIR TAPERCUT NDL VCP864D